# Patient Record
Sex: MALE | Race: WHITE | NOT HISPANIC OR LATINO | Employment: OTHER | URBAN - METROPOLITAN AREA
[De-identification: names, ages, dates, MRNs, and addresses within clinical notes are randomized per-mention and may not be internally consistent; named-entity substitution may affect disease eponyms.]

---

## 2017-10-07 ENCOUNTER — HOSPITAL ENCOUNTER (INPATIENT)
Facility: MEDICAL CENTER | Age: 72
LOS: 7 days | DRG: 281 | End: 2017-10-14
Attending: EMERGENCY MEDICINE | Admitting: HOSPITALIST
Payer: COMMERCIAL

## 2017-10-07 ENCOUNTER — RESOLUTE PROFESSIONAL BILLING HOSPITAL PROF FEE (OUTPATIENT)
Dept: HOSPITALIST | Facility: MEDICAL CENTER | Age: 72
End: 2017-10-07
Payer: COMMERCIAL

## 2017-10-07 DIAGNOSIS — I21.4 NSTEMI (NON-ST ELEVATED MYOCARDIAL INFARCTION) (HCC): ICD-10-CM

## 2017-10-07 LAB
ALBUMIN SERPL BCP-MCNC: 3.4 G/DL (ref 3.2–4.9)
ALBUMIN/GLOB SERPL: 1 G/DL
ALP SERPL-CCNC: 86 U/L (ref 30–99)
ALT SERPL-CCNC: 55 U/L (ref 2–50)
ANION GAP SERPL CALC-SCNC: 12 MMOL/L (ref 0–11.9)
APTT PPP: 39.9 SEC (ref 24.7–36)
AST SERPL-CCNC: 67 U/L (ref 12–45)
BASOPHILS # BLD AUTO: 0.8 % (ref 0–1.8)
BASOPHILS # BLD: 0.13 K/UL (ref 0–0.12)
BILIRUB SERPL-MCNC: 1.3 MG/DL (ref 0.1–1.5)
BNP SERPL-MCNC: 330 PG/ML (ref 0–100)
BUN SERPL-MCNC: 23 MG/DL (ref 8–22)
CALCIUM SERPL-MCNC: 8.8 MG/DL (ref 8.5–10.5)
CHLORIDE SERPL-SCNC: 105 MMOL/L (ref 96–112)
CO2 SERPL-SCNC: 16 MMOL/L (ref 20–33)
CREAT SERPL-MCNC: 1.5 MG/DL (ref 0.5–1.4)
EOSINOPHIL # BLD AUTO: 0.23 K/UL (ref 0–0.51)
EOSINOPHIL NFR BLD: 1.4 % (ref 0–6.9)
ERYTHROCYTE [DISTWIDTH] IN BLOOD BY AUTOMATED COUNT: 42.8 FL (ref 35.9–50)
GFR SERPL CREATININE-BSD FRML MDRD: 46 ML/MIN/1.73 M 2
GLOBULIN SER CALC-MCNC: 3.4 G/DL (ref 1.9–3.5)
GLUCOSE SERPL-MCNC: 154 MG/DL (ref 65–99)
HCT VFR BLD AUTO: 42 % (ref 42–52)
HGB BLD-MCNC: 14.6 G/DL (ref 14–18)
IMM GRANULOCYTES # BLD AUTO: 0.17 K/UL (ref 0–0.11)
IMM GRANULOCYTES NFR BLD AUTO: 1 % (ref 0–0.9)
INR PPP: 1.55 (ref 0.87–1.13)
LV EJECT FRACT  99904: 20
LV EJECT FRACT MOD 2C 99903: 26.72
LV EJECT FRACT MOD 4C 99902: 32.65
LV EJECT FRACT MOD BP 99901: 15
LYMPHOCYTES # BLD AUTO: 3.2 K/UL (ref 1–4.8)
LYMPHOCYTES NFR BLD: 19.6 % (ref 22–41)
MCH RBC QN AUTO: 31 PG (ref 27–33)
MCHC RBC AUTO-ENTMCNC: 34.8 G/DL (ref 33.7–35.3)
MCV RBC AUTO: 89.2 FL (ref 81.4–97.8)
MONOCYTES # BLD AUTO: 1.64 K/UL (ref 0–0.85)
MONOCYTES NFR BLD AUTO: 10 % (ref 0–13.4)
NEUTROPHILS # BLD AUTO: 10.98 K/UL (ref 1.82–7.42)
NEUTROPHILS NFR BLD: 67.2 % (ref 44–72)
NRBC # BLD AUTO: 0 K/UL
NRBC BLD AUTO-RTO: 0 /100 WBC
PLATELET # BLD AUTO: 169 K/UL (ref 164–446)
PMV BLD AUTO: 11.6 FL (ref 9–12.9)
POTASSIUM SERPL-SCNC: 3.9 MMOL/L (ref 3.6–5.5)
PROT SERPL-MCNC: 6.8 G/DL (ref 6–8.2)
PROTHROMBIN TIME: 19.1 SEC (ref 12–14.6)
RBC # BLD AUTO: 4.71 M/UL (ref 4.7–6.1)
SODIUM SERPL-SCNC: 133 MMOL/L (ref 135–145)
TROPONIN I SERPL-MCNC: 2.85 NG/ML (ref 0–0.04)
WBC # BLD AUTO: 16.4 K/UL (ref 4.8–10.8)

## 2017-10-07 PROCEDURE — 700111 HCHG RX REV CODE 636 W/ 250 OVERRIDE (IP)

## 2017-10-07 PROCEDURE — 93306 TTE W/DOPPLER COMPLETE: CPT

## 2017-10-07 PROCEDURE — 99223 1ST HOSP IP/OBS HIGH 75: CPT | Performed by: HOSPITALIST

## 2017-10-07 PROCEDURE — 83880 ASSAY OF NATRIURETIC PEPTIDE: CPT

## 2017-10-07 PROCEDURE — 84484 ASSAY OF TROPONIN QUANT: CPT

## 2017-10-07 PROCEDURE — 85610 PROTHROMBIN TIME: CPT

## 2017-10-07 PROCEDURE — 93306 TTE W/DOPPLER COMPLETE: CPT | Mod: 26 | Performed by: INTERNAL MEDICINE

## 2017-10-07 PROCEDURE — 85025 COMPLETE CBC W/AUTO DIFF WBC: CPT

## 2017-10-07 PROCEDURE — 99285 EMERGENCY DEPT VISIT HI MDM: CPT

## 2017-10-07 PROCEDURE — 85730 THROMBOPLASTIN TIME PARTIAL: CPT

## 2017-10-07 PROCEDURE — 770020 HCHG ROOM/CARE - TELE (206)

## 2017-10-07 PROCEDURE — 80053 COMPREHEN METABOLIC PANEL: CPT

## 2017-10-07 PROCEDURE — 36415 COLL VENOUS BLD VENIPUNCTURE: CPT

## 2017-10-07 PROCEDURE — 94760 N-INVAS EAR/PLS OXIMETRY 1: CPT

## 2017-10-07 PROCEDURE — 93005 ELECTROCARDIOGRAM TRACING: CPT | Performed by: EMERGENCY MEDICINE

## 2017-10-07 RX ORDER — SPIRONOLACTONE 25 MG/1
12.5 TABLET ORAL DAILY
COMMUNITY

## 2017-10-07 RX ORDER — METOPROLOL SUCCINATE 50 MG/1
100 TABLET, EXTENDED RELEASE ORAL DAILY
Status: DISCONTINUED | OUTPATIENT
Start: 2017-10-08 | End: 2017-10-13

## 2017-10-07 RX ORDER — LOSARTAN POTASSIUM 50 MG/1
50 TABLET ORAL DAILY
COMMUNITY

## 2017-10-07 RX ORDER — HEPARIN SODIUM 1000 [USP'U]/ML
3200 INJECTION, SOLUTION INTRAVENOUS; SUBCUTANEOUS PRN
Status: DISCONTINUED | OUTPATIENT
Start: 2017-10-07 | End: 2017-10-07

## 2017-10-07 RX ORDER — FENOFIBRATE 54 MG/1
54 TABLET ORAL DAILY
Status: ON HOLD | COMMUNITY
End: 2017-10-14

## 2017-10-07 RX ORDER — HEPARIN SODIUM 1000 [USP'U]/ML
6000 INJECTION, SOLUTION INTRAVENOUS; SUBCUTANEOUS ONCE
Status: DISCONTINUED | OUTPATIENT
Start: 2017-10-07 | End: 2017-10-07

## 2017-10-07 RX ORDER — RANITIDINE 150 MG/1
150 TABLET ORAL 2 TIMES DAILY
COMMUNITY

## 2017-10-07 RX ORDER — ONDANSETRON 2 MG/ML
4 INJECTION INTRAMUSCULAR; INTRAVENOUS EVERY 4 HOURS PRN
Status: DISCONTINUED | OUTPATIENT
Start: 2017-10-07 | End: 2017-10-08

## 2017-10-07 RX ORDER — GABAPENTIN 300 MG/1
600 CAPSULE ORAL 2 TIMES DAILY
COMMUNITY

## 2017-10-07 RX ORDER — ONDANSETRON 4 MG/1
4 TABLET, ORALLY DISINTEGRATING ORAL EVERY 4 HOURS PRN
Status: DISCONTINUED | OUTPATIENT
Start: 2017-10-07 | End: 2017-10-08

## 2017-10-07 RX ORDER — FUROSEMIDE 20 MG/1
20 TABLET ORAL DAILY
COMMUNITY

## 2017-10-07 RX ORDER — METOPROLOL SUCCINATE 100 MG/1
100 TABLET, EXTENDED RELEASE ORAL DAILY
Status: ON HOLD | COMMUNITY
End: 2017-10-14

## 2017-10-07 RX ORDER — ACETAMINOPHEN 325 MG/1
650 TABLET ORAL EVERY 6 HOURS PRN
Status: DISCONTINUED | OUTPATIENT
Start: 2017-10-07 | End: 2017-10-14 | Stop reason: HOSPADM

## 2017-10-07 RX ORDER — HEPARIN SODIUM 1000 [USP'U]/ML
3200 INJECTION, SOLUTION INTRAVENOUS; SUBCUTANEOUS PRN
Status: DISCONTINUED | OUTPATIENT
Start: 2017-10-07 | End: 2017-10-08

## 2017-10-07 RX ORDER — BISACODYL 10 MG
10 SUPPOSITORY, RECTAL RECTAL
Status: DISCONTINUED | OUTPATIENT
Start: 2017-10-07 | End: 2017-10-14 | Stop reason: HOSPADM

## 2017-10-07 RX ORDER — OXYCODONE HYDROCHLORIDE 5 MG/1
2.5 TABLET ORAL
Status: DISCONTINUED | OUTPATIENT
Start: 2017-10-07 | End: 2017-10-14 | Stop reason: HOSPADM

## 2017-10-07 RX ORDER — FUROSEMIDE 20 MG/1
20 TABLET ORAL DAILY
Status: DISCONTINUED | OUTPATIENT
Start: 2017-10-08 | End: 2017-10-14 | Stop reason: HOSPADM

## 2017-10-07 RX ORDER — FENOFIBRATE 54 MG/1
54 TABLET ORAL DAILY
Status: DISCONTINUED | OUTPATIENT
Start: 2017-10-08 | End: 2017-10-07

## 2017-10-07 RX ORDER — ISOSORBIDE MONONITRATE 60 MG/1
60 TABLET, EXTENDED RELEASE ORAL EVERY MORNING
COMMUNITY

## 2017-10-07 RX ORDER — SPIRONOLACTONE 25 MG/1
12.5 TABLET ORAL DAILY
Status: DISCONTINUED | OUTPATIENT
Start: 2017-10-08 | End: 2017-10-08

## 2017-10-07 RX ORDER — RANITIDINE 150 MG/1
150 TABLET ORAL 2 TIMES DAILY
Status: DISCONTINUED | OUTPATIENT
Start: 2017-10-07 | End: 2017-10-07

## 2017-10-07 RX ORDER — ISOSORBIDE MONONITRATE 30 MG/1
60 TABLET, EXTENDED RELEASE ORAL EVERY MORNING
Status: DISCONTINUED | OUTPATIENT
Start: 2017-10-08 | End: 2017-10-14 | Stop reason: HOSPADM

## 2017-10-07 RX ORDER — FENOFIBRATE 67 MG/1
67 CAPSULE ORAL DAILY
Status: DISCONTINUED | OUTPATIENT
Start: 2017-10-08 | End: 2017-10-08

## 2017-10-07 RX ORDER — FAMOTIDINE 20 MG/1
20 TABLET, FILM COATED ORAL 2 TIMES DAILY
Status: DISCONTINUED | OUTPATIENT
Start: 2017-10-08 | End: 2017-10-14 | Stop reason: HOSPADM

## 2017-10-07 RX ORDER — POLYETHYLENE GLYCOL 3350 17 G/17G
1 POWDER, FOR SOLUTION ORAL
Status: DISCONTINUED | OUTPATIENT
Start: 2017-10-07 | End: 2017-10-14 | Stop reason: HOSPADM

## 2017-10-07 RX ORDER — LOSARTAN POTASSIUM 50 MG/1
50 TABLET ORAL DAILY
Status: DISCONTINUED | OUTPATIENT
Start: 2017-10-08 | End: 2017-10-10

## 2017-10-07 RX ORDER — DEXTROSE MONOHYDRATE 25 G/50ML
25 INJECTION, SOLUTION INTRAVENOUS
Status: DISCONTINUED | OUTPATIENT
Start: 2017-10-07 | End: 2017-10-14 | Stop reason: HOSPADM

## 2017-10-07 RX ORDER — AMOXICILLIN 250 MG
2 CAPSULE ORAL 2 TIMES DAILY
Status: DISCONTINUED | OUTPATIENT
Start: 2017-10-08 | End: 2017-10-14 | Stop reason: HOSPADM

## 2017-10-07 RX ORDER — GABAPENTIN 300 MG/1
600 CAPSULE ORAL 2 TIMES DAILY
Status: DISCONTINUED | OUTPATIENT
Start: 2017-10-08 | End: 2017-10-14 | Stop reason: HOSPADM

## 2017-10-07 RX ORDER — OXYCODONE HYDROCHLORIDE 5 MG/1
5 TABLET ORAL
Status: DISCONTINUED | OUTPATIENT
Start: 2017-10-07 | End: 2017-10-14 | Stop reason: HOSPADM

## 2017-10-07 RX ADMIN — HEPARIN SODIUM 1200 UNITS/HR: 5000 INJECTION, SOLUTION INTRAVENOUS at 23:46

## 2017-10-07 ASSESSMENT — PATIENT HEALTH QUESTIONNAIRE - PHQ9
SUM OF ALL RESPONSES TO PHQ QUESTIONS 1-9: 0
SUM OF ALL RESPONSES TO PHQ9 QUESTIONS 1 AND 2: 0
1. LITTLE INTEREST OR PLEASURE IN DOING THINGS: NOT AT ALL
2. FEELING DOWN, DEPRESSED, IRRITABLE, OR HOPELESS: NOT AT ALL

## 2017-10-07 ASSESSMENT — COPD QUESTIONNAIRES
HAVE YOU SMOKED AT LEAST 100 CIGARETTES IN YOUR ENTIRE LIFE: YES
DURING THE PAST 4 WEEKS HOW MUCH DID YOU FEEL SHORT OF BREATH: NONE/LITTLE OF THE TIME
DO YOU EVER COUGH UP ANY MUCUS OR PHLEGM?: NO/ONLY WITH OCCASIONAL COLDS OR INFECTIONS
COPD SCREENING SCORE: 4

## 2017-10-07 ASSESSMENT — LIFESTYLE VARIABLES
EVER_SMOKED: YES
EVER_SMOKED: YES
ALCOHOL_USE: NO

## 2017-10-07 ASSESSMENT — PAIN SCALES - GENERAL: PAINLEVEL_OUTOF10: 0

## 2017-10-08 ENCOUNTER — APPOINTMENT (OUTPATIENT)
Dept: RADIOLOGY | Facility: MEDICAL CENTER | Age: 72
DRG: 281 | End: 2017-10-08
Attending: INTERNAL MEDICINE
Payer: COMMERCIAL

## 2017-10-08 PROBLEM — E11.9 DIABETES MELLITUS (HCC): Status: ACTIVE | Noted: 2017-10-08

## 2017-10-08 PROBLEM — I50.22 CHRONIC SYSTOLIC CHF (CONGESTIVE HEART FAILURE) (HCC): Status: ACTIVE | Noted: 2017-10-08

## 2017-10-08 PROBLEM — I42.9 CARDIOMYOPATHY (HCC): Status: ACTIVE | Noted: 2017-10-08

## 2017-10-08 PROBLEM — E78.5 DYSLIPIDEMIA: Status: ACTIVE | Noted: 2017-10-08

## 2017-10-08 PROBLEM — Z95.810 AICD (AUTOMATIC CARDIOVERTER/DEFIBRILLATOR) PRESENT: Status: ACTIVE | Noted: 2017-10-08

## 2017-10-08 PROBLEM — I10 HTN (HYPERTENSION): Status: ACTIVE | Noted: 2017-10-08

## 2017-10-08 PROBLEM — I48.0 PAROXYSMAL ATRIAL FIBRILLATION (HCC): Status: ACTIVE | Noted: 2017-10-08

## 2017-10-08 PROBLEM — D68.9 COAGULOPATHY (HCC): Status: ACTIVE | Noted: 2017-10-08

## 2017-10-08 PROBLEM — N19 RENAL FAILURE: Status: ACTIVE | Noted: 2017-10-08

## 2017-10-08 LAB
ACT BLD: 158 SEC (ref 74–137)
ANION GAP SERPL CALC-SCNC: 9 MMOL/L (ref 0–11.9)
APPEARANCE UR: CLEAR
APTT PPP: 82.7 SEC (ref 24.7–36)
APTT PPP: 83.4 SEC (ref 24.7–36)
BACTERIA #/AREA URNS HPF: NEGATIVE /HPF
BILIRUB UR QL STRIP.AUTO: NEGATIVE
BUN SERPL-MCNC: 23 MG/DL (ref 8–22)
CALCIUM SERPL-MCNC: 8.3 MG/DL (ref 8.5–10.5)
CHLORIDE SERPL-SCNC: 105 MMOL/L (ref 96–112)
CO2 SERPL-SCNC: 21 MMOL/L (ref 20–33)
COLOR UR: YELLOW
CREAT SERPL-MCNC: 1.35 MG/DL (ref 0.5–1.4)
EKG IMPRESSION: NORMAL
EPI CELLS #/AREA URNS HPF: NEGATIVE /HPF
ERYTHROCYTE [DISTWIDTH] IN BLOOD BY AUTOMATED COUNT: 43.6 FL (ref 35.9–50)
GFR SERPL CREATININE-BSD FRML MDRD: 52 ML/MIN/1.73 M 2
GLUCOSE BLD-MCNC: 186 MG/DL (ref 65–99)
GLUCOSE BLD-MCNC: 211 MG/DL (ref 65–99)
GLUCOSE BLD-MCNC: 229 MG/DL (ref 65–99)
GLUCOSE BLD-MCNC: 256 MG/DL (ref 65–99)
GLUCOSE SERPL-MCNC: 194 MG/DL (ref 65–99)
GLUCOSE UR STRIP.AUTO-MCNC: 100 MG/DL
HCT VFR BLD AUTO: 41.6 % (ref 42–52)
HGB BLD-MCNC: 14.3 G/DL (ref 14–18)
HYALINE CASTS #/AREA URNS LPF: ABNORMAL /LPF
INR PPP: 1.51 (ref 0.87–1.13)
KETONES UR STRIP.AUTO-MCNC: NEGATIVE MG/DL
LEUKOCYTE ESTERASE UR QL STRIP.AUTO: NEGATIVE
MCH RBC QN AUTO: 31 PG (ref 27–33)
MCHC RBC AUTO-ENTMCNC: 34.4 G/DL (ref 33.7–35.3)
MCV RBC AUTO: 90.2 FL (ref 81.4–97.8)
MICRO URNS: ABNORMAL
NITRITE UR QL STRIP.AUTO: NEGATIVE
PH UR STRIP.AUTO: 5 [PH]
PLATELET # BLD AUTO: 164 K/UL (ref 164–446)
PMV BLD AUTO: 11.3 FL (ref 9–12.9)
POTASSIUM SERPL-SCNC: 3.9 MMOL/L (ref 3.6–5.5)
PROT UR QL STRIP: 100 MG/DL
PROTHROMBIN TIME: 18.7 SEC (ref 12–14.6)
PTH-INTACT SERPL-MCNC: 153.9 PG/ML (ref 14–72)
RBC # BLD AUTO: 4.61 M/UL (ref 4.7–6.1)
RBC # URNS HPF: ABNORMAL /HPF
RBC UR QL AUTO: NEGATIVE
SODIUM SERPL-SCNC: 135 MMOL/L (ref 135–145)
SP GR UR STRIP.AUTO: 1.01
TROPONIN I SERPL-MCNC: 2.65 NG/ML (ref 0–0.04)
TROPONIN I SERPL-MCNC: 3.16 NG/ML (ref 0–0.04)
UROBILINOGEN UR STRIP.AUTO-MCNC: 0.2 MG/DL
WBC # BLD AUTO: 13.3 K/UL (ref 4.8–10.8)
WBC #/AREA URNS HPF: ABNORMAL /HPF

## 2017-10-08 PROCEDURE — 700102 HCHG RX REV CODE 250 W/ 637 OVERRIDE(OP)

## 2017-10-08 PROCEDURE — A9270 NON-COVERED ITEM OR SERVICE: HCPCS

## 2017-10-08 PROCEDURE — 93458 L HRT ARTERY/VENTRICLE ANGIO: CPT

## 2017-10-08 PROCEDURE — C1769 GUIDE WIRE: HCPCS

## 2017-10-08 PROCEDURE — 71020 DX-CHEST-2 VIEWS: CPT

## 2017-10-08 PROCEDURE — 700111 HCHG RX REV CODE 636 W/ 250 OVERRIDE (IP): Performed by: FAMILY MEDICINE

## 2017-10-08 PROCEDURE — 90662 IIV NO PRSV INCREASED AG IM: CPT | Performed by: FAMILY MEDICINE

## 2017-10-08 PROCEDURE — 3E0234Z INTRODUCTION OF SERUM, TOXOID AND VACCINE INTO MUSCLE, PERCUTANEOUS APPROACH: ICD-10-PCS | Performed by: HOSPITALIST

## 2017-10-08 PROCEDURE — 83970 ASSAY OF PARATHORMONE: CPT

## 2017-10-08 PROCEDURE — C1887 CATHETER, GUIDING: HCPCS

## 2017-10-08 PROCEDURE — 85347 COAGULATION TIME ACTIVATED: CPT

## 2017-10-08 PROCEDURE — B2151ZZ FLUOROSCOPY OF LEFT HEART USING LOW OSMOLAR CONTRAST: ICD-10-PCS | Performed by: INTERNAL MEDICINE

## 2017-10-08 PROCEDURE — 4A023N7 MEASUREMENT OF CARDIAC SAMPLING AND PRESSURE, LEFT HEART, PERCUTANEOUS APPROACH: ICD-10-PCS | Performed by: INTERNAL MEDICINE

## 2017-10-08 PROCEDURE — 84484 ASSAY OF TROPONIN QUANT: CPT | Mod: 91

## 2017-10-08 PROCEDURE — A9270 NON-COVERED ITEM OR SERVICE: HCPCS | Performed by: HOSPITALIST

## 2017-10-08 PROCEDURE — 307093 HCHG TR BAND RADIAL

## 2017-10-08 PROCEDURE — 36415 COLL VENOUS BLD VENIPUNCTURE: CPT

## 2017-10-08 PROCEDURE — 700105 HCHG RX REV CODE 258: Performed by: FAMILY MEDICINE

## 2017-10-08 PROCEDURE — B2111ZZ FLUOROSCOPY OF MULTIPLE CORONARY ARTERIES USING LOW OSMOLAR CONTRAST: ICD-10-PCS | Performed by: INTERNAL MEDICINE

## 2017-10-08 PROCEDURE — A9270 NON-COVERED ITEM OR SERVICE: HCPCS | Performed by: INTERNAL MEDICINE

## 2017-10-08 PROCEDURE — 82962 GLUCOSE BLOOD TEST: CPT | Mod: 91

## 2017-10-08 PROCEDURE — 85610 PROTHROMBIN TIME: CPT

## 2017-10-08 PROCEDURE — 700101 HCHG RX REV CODE 250

## 2017-10-08 PROCEDURE — 99233 SBSQ HOSP IP/OBS HIGH 50: CPT | Performed by: FAMILY MEDICINE

## 2017-10-08 PROCEDURE — 700111 HCHG RX REV CODE 636 W/ 250 OVERRIDE (IP)

## 2017-10-08 PROCEDURE — 700105 HCHG RX REV CODE 258: Performed by: INTERNAL MEDICINE

## 2017-10-08 PROCEDURE — 360979 HCHG DIAGNOSTIC CATH

## 2017-10-08 PROCEDURE — C1894 INTRO/SHEATH, NON-LASER: HCPCS

## 2017-10-08 PROCEDURE — 85730 THROMBOPLASTIN TIME PARTIAL: CPT

## 2017-10-08 PROCEDURE — 99152 MOD SED SAME PHYS/QHP 5/>YRS: CPT

## 2017-10-08 PROCEDURE — 700102 HCHG RX REV CODE 250 W/ 637 OVERRIDE(OP): Performed by: INTERNAL MEDICINE

## 2017-10-08 PROCEDURE — 80048 BASIC METABOLIC PNL TOTAL CA: CPT

## 2017-10-08 PROCEDURE — 85027 COMPLETE CBC AUTOMATED: CPT

## 2017-10-08 PROCEDURE — 93010 ELECTROCARDIOGRAM REPORT: CPT | Performed by: INTERNAL MEDICINE

## 2017-10-08 PROCEDURE — 81001 URINALYSIS AUTO W/SCOPE: CPT

## 2017-10-08 PROCEDURE — 93005 ELECTROCARDIOGRAM TRACING: CPT | Performed by: FAMILY MEDICINE

## 2017-10-08 PROCEDURE — 304952 HCHG R 2 PADS

## 2017-10-08 PROCEDURE — 700102 HCHG RX REV CODE 250 W/ 637 OVERRIDE(OP): Performed by: HOSPITALIST

## 2017-10-08 PROCEDURE — 770020 HCHG ROOM/CARE - TELE (206)

## 2017-10-08 PROCEDURE — 90471 IMMUNIZATION ADMIN: CPT

## 2017-10-08 RX ORDER — CLOPIDOGREL BISULFATE 75 MG/1
75 TABLET ORAL DAILY
Status: DISCONTINUED | OUTPATIENT
Start: 2017-10-08 | End: 2017-10-08

## 2017-10-08 RX ORDER — ASPIRIN 325 MG
325 TABLET ORAL DAILY
Status: DISCONTINUED | OUTPATIENT
Start: 2017-10-08 | End: 2017-10-08

## 2017-10-08 RX ORDER — MIDAZOLAM HYDROCHLORIDE 1 MG/ML
INJECTION INTRAMUSCULAR; INTRAVENOUS
Status: COMPLETED
Start: 2017-10-08 | End: 2017-10-08

## 2017-10-08 RX ORDER — SODIUM CHLORIDE 9 MG/ML
INJECTION, SOLUTION INTRAVENOUS CONTINUOUS
Status: DISCONTINUED | OUTPATIENT
Start: 2017-10-08 | End: 2017-10-08

## 2017-10-08 RX ORDER — DEXAMETHASONE SODIUM PHOSPHATE 4 MG/ML
4 INJECTION, SOLUTION INTRA-ARTICULAR; INTRALESIONAL; INTRAMUSCULAR; INTRAVENOUS; SOFT TISSUE
Status: DISCONTINUED | OUTPATIENT
Start: 2017-10-08 | End: 2017-10-14 | Stop reason: HOSPADM

## 2017-10-08 RX ORDER — SCOLOPAMINE TRANSDERMAL SYSTEM 1 MG/1
1 PATCH, EXTENDED RELEASE TRANSDERMAL
Status: DISCONTINUED | OUTPATIENT
Start: 2017-10-08 | End: 2017-10-14 | Stop reason: HOSPADM

## 2017-10-08 RX ORDER — SODIUM CHLORIDE 9 MG/ML
INJECTION, SOLUTION INTRAVENOUS CONTINUOUS
Status: ACTIVE | OUTPATIENT
Start: 2017-10-08 | End: 2017-10-08

## 2017-10-08 RX ORDER — VERAPAMIL HYDROCHLORIDE 2.5 MG/ML
INJECTION, SOLUTION INTRAVENOUS
Status: COMPLETED
Start: 2017-10-08 | End: 2017-10-08

## 2017-10-08 RX ORDER — CLOPIDOGREL BISULFATE 75 MG/1
300 TABLET ORAL ONCE
Status: COMPLETED | OUTPATIENT
Start: 2017-10-08 | End: 2017-10-08

## 2017-10-08 RX ORDER — CLOPIDOGREL 300 MG/1
TABLET, FILM COATED ORAL
Status: COMPLETED
Start: 2017-10-08 | End: 2017-10-08

## 2017-10-08 RX ORDER — CLOPIDOGREL BISULFATE 75 MG/1
75 TABLET ORAL DAILY
Status: DISCONTINUED | OUTPATIENT
Start: 2017-10-09 | End: 2017-10-14 | Stop reason: HOSPADM

## 2017-10-08 RX ORDER — ROSUVASTATIN CALCIUM 20 MG/1
40 TABLET, COATED ORAL EVERY EVENING
Status: DISCONTINUED | OUTPATIENT
Start: 2017-10-08 | End: 2017-10-14 | Stop reason: HOSPADM

## 2017-10-08 RX ORDER — HEPARIN SODIUM,PORCINE 1000/ML
VIAL (ML) INJECTION
Status: COMPLETED
Start: 2017-10-08 | End: 2017-10-08

## 2017-10-08 RX ORDER — SODIUM CHLORIDE 9 MG/ML
INJECTION, SOLUTION INTRAVENOUS
Status: ACTIVE
Start: 2017-10-08 | End: 2017-10-08

## 2017-10-08 RX ORDER — ROSUVASTATIN CALCIUM 20 MG/1
20 TABLET, COATED ORAL EVERY EVENING
Status: DISCONTINUED | OUTPATIENT
Start: 2017-10-08 | End: 2017-10-08

## 2017-10-08 RX ORDER — HALOPERIDOL 5 MG/ML
1 INJECTION INTRAMUSCULAR EVERY 6 HOURS PRN
Status: DISCONTINUED | OUTPATIENT
Start: 2017-10-08 | End: 2017-10-14 | Stop reason: HOSPADM

## 2017-10-08 RX ORDER — SPIRONOLACTONE 25 MG/1
25 TABLET ORAL DAILY
Status: DISCONTINUED | OUTPATIENT
Start: 2017-10-09 | End: 2017-10-08

## 2017-10-08 RX ORDER — ONDANSETRON 2 MG/ML
4 INJECTION INTRAMUSCULAR; INTRAVENOUS EVERY 4 HOURS PRN
Status: DISCONTINUED | OUTPATIENT
Start: 2017-10-08 | End: 2017-10-14 | Stop reason: HOSPADM

## 2017-10-08 RX ORDER — LIDOCAINE HYDROCHLORIDE 20 MG/ML
INJECTION, SOLUTION INFILTRATION; PERINEURAL
Status: COMPLETED
Start: 2017-10-08 | End: 2017-10-08

## 2017-10-08 RX ORDER — ASPIRIN 325 MG
81 TABLET ORAL DAILY
Status: DISCONTINUED | OUTPATIENT
Start: 2017-10-09 | End: 2017-10-08

## 2017-10-08 RX ORDER — DIPHENHYDRAMINE HYDROCHLORIDE 50 MG/ML
25 INJECTION INTRAMUSCULAR; INTRAVENOUS EVERY 6 HOURS PRN
Status: DISCONTINUED | OUTPATIENT
Start: 2017-10-08 | End: 2017-10-14 | Stop reason: HOSPADM

## 2017-10-08 RX ADMIN — INSULIN LISPRO 5 UNITS: 100 INJECTION, SOLUTION INTRAVENOUS; SUBCUTANEOUS at 21:18

## 2017-10-08 RX ADMIN — LIDOCAINE HYDROCHLORIDE: 20 INJECTION, SOLUTION INFILTRATION; PERINEURAL at 14:05

## 2017-10-08 RX ADMIN — CLOPIDOGREL BISULFATE 300 MG: 300 TABLET, FILM COATED ORAL at 14:28

## 2017-10-08 RX ADMIN — METOPROLOL SUCCINATE 100 MG: 50 TABLET, EXTENDED RELEASE ORAL at 08:22

## 2017-10-08 RX ADMIN — MIDAZOLAM 1 MG: 1 INJECTION INTRAMUSCULAR; INTRAVENOUS at 14:14

## 2017-10-08 RX ADMIN — SODIUM CHLORIDE: 9 INJECTION, SOLUTION INTRAVENOUS at 15:37

## 2017-10-08 RX ADMIN — VERAPAMIL HYDROCHLORIDE 2.5 MG: 2.5 INJECTION, SOLUTION INTRAVENOUS at 14:05

## 2017-10-08 RX ADMIN — HEPARIN SODIUM 2000 UNITS: 200 INJECTION, SOLUTION INTRAVENOUS at 14:05

## 2017-10-08 RX ADMIN — GABAPENTIN 600 MG: 300 CAPSULE ORAL at 21:08

## 2017-10-08 RX ADMIN — GABAPENTIN 600 MG: 300 CAPSULE ORAL at 08:22

## 2017-10-08 RX ADMIN — FAMOTIDINE 20 MG: 20 TABLET, FILM COATED ORAL at 21:09

## 2017-10-08 RX ADMIN — INFLUENZA A VIRUSA/MICHIGAN/45/2015 X-275 (H1N1) ANTIGEN (FORMALDEHYDE INACTIVATED), INFLUENZA A VIRUS A/HONG KONG/4801/2014 X-263B (H3N2) ANTIGEN (FORMALDEHYDE INACTIVATED), AND INFLUENZA B VIRUS B/BRISBANE/60/2008 ANTIGEN (FORMALDEHYDE INACTIVATED) 0.5 ML: 60; 60; 60 INJECTION, SUSPENSION INTRAMUSCULAR at 21:10

## 2017-10-08 RX ADMIN — ISOSORBIDE MONONITRATE 60 MG: 30 TABLET, EXTENDED RELEASE ORAL at 08:22

## 2017-10-08 RX ADMIN — ASPIRIN 325 MG: 325 TABLET, COATED ORAL at 08:23

## 2017-10-08 RX ADMIN — ROSUVASTATIN CALCIUM 40 MG: 20 TABLET, FILM COATED ORAL at 21:08

## 2017-10-08 RX ADMIN — STANDARDIZED SENNA CONCENTRATE AND DOCUSATE SODIUM 2 TABLET: 8.6; 5 TABLET, FILM COATED ORAL at 08:22

## 2017-10-08 RX ADMIN — INSULIN LISPRO 3 UNITS: 100 INJECTION, SOLUTION INTRAVENOUS; SUBCUTANEOUS at 18:06

## 2017-10-08 RX ADMIN — CLOPIDOGREL 300 MG: 75 TABLET, FILM COATED ORAL at 16:51

## 2017-10-08 RX ADMIN — NITROGLYCERIN 10 ML: 20 INJECTION INTRAVENOUS at 14:05

## 2017-10-08 RX ADMIN — HEPARIN SODIUM: 1000 INJECTION, SOLUTION INTRAVENOUS; SUBCUTANEOUS at 14:05

## 2017-10-08 RX ADMIN — INSULIN LISPRO 2 UNITS: 100 INJECTION, SOLUTION INTRAVENOUS; SUBCUTANEOUS at 12:39

## 2017-10-08 RX ADMIN — STANDARDIZED SENNA CONCENTRATE AND DOCUSATE SODIUM 2 TABLET: 8.6; 5 TABLET, FILM COATED ORAL at 21:09

## 2017-10-08 RX ADMIN — INSULIN LISPRO 3 UNITS: 100 INJECTION, SOLUTION INTRAVENOUS; SUBCUTANEOUS at 06:24

## 2017-10-08 RX ADMIN — SPIRONOLACTONE 12.5 MG: 25 TABLET, FILM COATED ORAL at 08:22

## 2017-10-08 RX ADMIN — LOSARTAN POTASSIUM 50 MG: 50 TABLET, FILM COATED ORAL at 08:22

## 2017-10-08 RX ADMIN — FAMOTIDINE 20 MG: 20 TABLET, FILM COATED ORAL at 08:23

## 2017-10-08 RX ADMIN — SODIUM CHLORIDE: 9 INJECTION, SOLUTION INTRAVENOUS at 08:31

## 2017-10-08 RX ADMIN — FENTANYL CITRATE 50 MCG: 50 INJECTION, SOLUTION INTRAMUSCULAR; INTRAVENOUS at 14:14

## 2017-10-08 RX ADMIN — FUROSEMIDE 20 MG: 20 TABLET ORAL at 08:23

## 2017-10-08 ASSESSMENT — PATIENT HEALTH QUESTIONNAIRE - PHQ9
2. FEELING DOWN, DEPRESSED, IRRITABLE, OR HOPELESS: NOT AT ALL
SUM OF ALL RESPONSES TO PHQ9 QUESTIONS 1 AND 2: 0
1. LITTLE INTEREST OR PLEASURE IN DOING THINGS: NOT AT ALL
SUM OF ALL RESPONSES TO PHQ QUESTIONS 1-9: 0

## 2017-10-08 ASSESSMENT — ENCOUNTER SYMPTOMS
HEADACHES: 0
FEVER: 1
SORE THROAT: 0
BLURRED VISION: 0
ABDOMINAL PAIN: 0
CHILLS: 0
DIARRHEA: 0
BRUISES/BLEEDS EASILY: 0
WHEEZING: 0
DIZZINESS: 0
NAUSEA: 0
SHORTNESS OF BREATH: 0
COUGH: 0
FEVER: 0
VOMITING: 0
HEARTBURN: 0

## 2017-10-08 ASSESSMENT — PAIN SCALES - GENERAL
PAINLEVEL_OUTOF10: 0

## 2017-10-08 NOTE — ASSESSMENT & PLAN NOTE
Severe multi vessel  CAD. Previously not surgical candidate for bypass- no new chest pain/ dyspnea.   Fu Thallium study results-- await cards input.   Optimize medications w ASA, Crestor, metoprolol.   Long acting nitrate.

## 2017-10-08 NOTE — PROCEDURES
DATE OF SERVICE:  10/08/2017    PROCEDURES:  1.  Selective coronary angiography.  2.  Left heart catheterization.  3.  Left ventriculography.    INDICATIONS:  A 72-year-old gentleman with known ischemic cardiomyopathy,   [facility].  He had no chest pain, but troponin   positive for non-STEMI myocardial infarction.  He has been told in the distant   past on remote angiogram that he was a nonoperable candidate.  The patient is   undergoing angiography at this time to determine the extent of his coronary   artery disease.    DESCRIPTION OF PROCEDURE:  The right wrist was sterilely prepped and draped in   the usual fashion and the area of the right radial artery anesthetized with   2% lidocaine.  Conscious sedation was obtained using Versed 1 mg and fentanyl   50 mcg.    Right radial artery was easily entered and a 6-[place] sheath was placed.  An   ACT was drawn as he was on heparin infusion and the patient was given   Intra-arterial verapamil and nitroglycerin.  A 5-[place] catheter was   placed and advanced into the ostium of the left main coronary artery where   selective angiograms were performed.  This catheter was then manipulated into   the ostium of the right coronary artery where selective angiograms were again   performed.    Next, a pigtail catheter was exchanged and a left heart catheterization was   performed.  This was followed by left ventriculogram using 24 mL of contrast.    A left heart pullback was performed.  The catheter was removed and additional   nitroglycerin was given through the sheath.  The sheath was then removed and   hemostasis was obtained by direct compression of the artery with the Hemoband.    There were no complications.    ESTIMATED BLOOD LOSS:  5 mL    FLUOROSCOPY TIME:  2.2 minutes.    X-RAY DOSE-AREA PRODUCT:  10,727.    TOTAL CONTRAST MEDIA:  100 mL of Omnipaque 350.  There were no complications.    HEMODYNAMICS:  Fluoroscopy of heart demonstrates  evidence of an AICD and   severe coronary artery calcification.    The left ventriculogram demonstrates left ventricular enlargement and severe   global hypokinesis.  The calculated ejection fraction is 23%.    The right coronary artery is a nondominant vessel and has elongated 50%   stenosis in its proximal segment.  There are no significant collaterals from   the right coronary artery to the left.    The left main is calcified and has less than 20% stenosis.  This bifurcates   into the LAD and circumflex.  The circumflex is a dominant vessel.  In the   proximal segment arises a small marginal artery.  At the level of the small   marginal artery, there is a high grade circumflex lesion that appears to be   90% in severity in some views.  The mid circumflex has mild plaquing and then   more distally arises are large marginal artery that has an elongated 60-70%   stenosis at its origin.  Beyond this point, the dominant circumflex is   occluded and the distal circumflex including the PDA and a distal marginal   artery fill via left-sided collaterals.    The LAD gives rise to first diagonal artery and then is completely occluded.    This large diagonal artery has a high-grade stenosis in its proximal segment   of 75%.  The mid to distal LAD is filled via left-sided collaterals.  The LAD   appears to be diffusely stenotic _____ the distal portion.    Hemodynamics reveal sinus rhythm throughout the procedure.  Aortic pressure   119/69 mmHg.  LV pressure 141/35 mmHg.    IMPRESSION:  1.  Severe left ventricular systolic dysfunction with ejection fraction of 23%   and elevated left ventricular end-diastolic pressure of 35.  2.  Coronary artery disease with complete occlusion of the mid left anterior   descending and distal dominant circumflex.  The mid to distal left anterior   descending filled via collaterals as does the posterior descending artery and   distal marginal artery of the circumflex.  3.  High grade stenosis  in the proximal circumflex, 80-90% in severity.  4.  Elongated 60-70% stenosis at the origin of the circumflex marginal artery   arising proximal to the occlusion of the circumflex.  5.  Large left anterior descending diagonal artery with at least 80% stenosis   in its proximal segment.  6.  Collateral filling of the mid to distal left anterior descending and   posterior descending artery of the circumflex via left-sided collaterals.       ____________________________________     MD YAJAIRA VALENCIA / NTS    DD:  10/08/2017 14:43:56  DT:  10/08/2017 15:24:16    D#:  [id number]  Job#:  [id number]

## 2017-10-08 NOTE — PROGRESS NOTES
Report received from ER nurse Muna. Pt transported up to tele 7 via gurney. Ambulated to bed x 1 assist with shuffle gait. Pt verbalized dizziness with ambulation. A&O x 4. Declines chest pain or any pain at this time. Assessment complete. Vitals stable. No other concerns, complains or distress. Tele box on. Chart reviewed. Bed in lowest position, treaded slipper sock on, and call light within reach.

## 2017-10-08 NOTE — ED NOTES
Wendy from Lab called with critical result of Trop 2.85 at 2052. Critical lab result read back to Wendy .   Dr. Hyde notified of critical lab result at 2054.  Critical lab result read back by Dr. Hyde.

## 2017-10-08 NOTE — ED NOTES
[facility] Pt c/o weakness, lethargy, and confusion. [facility] to [facility] pt had troponin of 2.7. Pt denies chest pain, but c/o SOB. HX 2 MI's, CHF, and AICD.     Pt was given 324 ASA and 200cc fluid prior to arrival.   Pt axo x4.

## 2017-10-08 NOTE — PROGRESS NOTES
Cardiology Progress Note               Author: Henrry Anglin Date & Time created: 10/8/2017  8:01 AM     Interval History:  [facility]opnea and dyspnea, but no chest pain  No angina, but troponin elevated c/w non STEMI.  Angiogram about 20 years ago and he was told he was non-operative.  EF ,30% with [facility]ICD [facility]    Review of Systems   Constitutional: Positive for fever.   Respiratory: Negative for shortness of breath.    Cardiovascular: Negative for chest pain.   Endo/Heme/Allergies: Does not bruise/bleed easily.       Physical Exam   Constitutional: He is oriented to person, place, and time. No distress.   HENT:   Head: Normocephalic and atraumatic.   Eyes: No scleral icterus.   Cardiovascular: Normal rate and regular rhythm.    No murmur heard.  Pulmonary/Chest: Breath sounds normal. No respiratory distress.   Abdominal: Soft. He exhibits no distension. There is no tenderness.   Neurological: He is alert and oriented to person, place, and time. No cranial nerve deficit.   Skin: Skin is warm and dry.       Hemodynamics:  Temp (24hrs), Av.4 °C (97.5 °F), Min:36.3 °C (97.4 °F), Max:36.4 °C (97.6 °F)  Temperature: 36.3 °C (97.4 °F)  Pulse  Av.4  Min: 99  Max: 103   Blood Pressure : 155/99, NIBP: 152/75     Respiratory:    Respiration: 18, Pulse Oximetry: 99 %, O2 Daily Delivery Respiratory : Room Air with O2 Available        RUL Breath Sounds: Clear, RML Breath Sounds: Clear, RLL Breath Sounds: Fine Crackles, CORBY Breath Sounds: Clear, LLL Breath Sounds: Fine Crackles  Fluids:     Weight: 104.9 kg (231 lb 4.2 oz)  GI/Nutrition:  Orders Placed This Encounter   Procedures   • Diet Order     Standing Status:   Standing     Number of Occurrences:   1     Order Specific Question:   Diet:     Answer:   Cardiac [6]     Order Specific Question:   Diet:     Answer:   Diabetic [3]   • Diet NPO after Midnight     Standing Status:   Standing     Number of Occurrences:   8      Order Specific Question:   Restrict to:     Answer:   Sips with Medications [3]     Lab Results:  Recent Labs      10/07/17   1945   WBC  16.4*   RBC  4.71   HEMOGLOBIN  14.6   HEMATOCRIT  42.0   MCV  89.2   MCH  31.0   MCHC  34.8   RDW  42.8   PLATELETCT  169   MPV  11.6     Recent Labs      10/07/17   1945   SODIUM  133*   POTASSIUM  3.9   CHLORIDE  105   CO2  16*   GLUCOSE  154*   BUN  23*   CREATININE  1.50*   CALCIUM  8.8     Recent Labs      10/07/17   1945  10/08/17   0528   APTT  39.9*  82.7*   INR  1.55*   --      Recent Labs      10/07/17   1945   BNPBTYPENAT  330*     Recent Labs      10/07/17   1945  10/08/17   0205  10/08/17   0528   TROPONINI  2.85*  2.65*  3.16*   BNPBTYPENAT  330*   --    --              Medical Decision Making, by Problem:  Active Hospital Problems    Diagnosis   • NSTEMI (non-ST elevated myocardial infarction) (CMS-[facility]) [I21.4]       Plan:  Non STEMI- troponins are rising.He was told many years ago that he was not an operative candidate. Plan angio later this morning.  S/P AICD-  Ischemic cardiomyopathy. BNP only 330 on admit  Diabetes mellitus type two.    Quality-Core Measures

## 2017-10-08 NOTE — H&P
DATE OF SERVICE:  10/07/2017    [facility]  [facility]    CHIEF COMPLAINT:  Dyspnea and lightheadedness.    HISTORY OF PRESENT ILLNESS:  Patient is a 72-year-old male with known history   of ischemic cardiomyopathy, ejection fraction of 20-25% and chronic atrial   fibrillation, currently receives his care at the [facility].  He was transferred   to our facility after he presented there complaining of lightheadedness,   dizziness and shortness of breath.  He denies any chest pain.  He has some   mild orthopnea.  He had 1 episode of diarrhea, but no melena or rectal   bleeding.  He has known history of ischemic cardiomyopathy, although he   reports that he has never had any cardiac stents placed.  He is on chronic   anticoagulation for his atrial fibrillation.  He reports that he has been   compliant with his medications.  He reports that his blood sugars have been   controlled.  He denies any loss of consciousness.  He denies any focal   weakness or numbness.  He denies any changes in his vision.  He denies any   palpitations.    REVIEW OF SYSTEMS:  As above, otherwise reviewed and negative.    PAST MEDICAL HISTORY:  Significant for ischemic cardiomyopathy, coronary   artery disease, type 2 diabetes, hypertension, atrial fibrillation and history   of stroke.    PAST SURGICAL HISTORY:  AICD implantation.    SOCIAL HISTORY:  He does not smoke.  No alcohol or illicit drug use.    FAMILY HISTORY:  Positive for diabetes, cardiovascular disease.    HOME MEDICATIONS:  Eliquis 5 mg b.i.d., vitamin D 5000 units once a week,   TriCor 54 mg daily, Lasix 20 mg daily, gabapentin 600 mg b.i.d., Imdur 60 mg   daily, losartan 50 mg daily, metformin 1000 mg b.i.d., Toprol- mg daily,   Zantac 150 mg daily and Aldactone 12.5 mg daily.    PHYSICAL EXAMINATION:  GENERAL:  He is alert, oriented x3.  VITAL SIGNS:  Temperature is 36.4, pulse is 99, respiratory rate is 18, blood   pressure 162/80 and pulse oximetry is  93%.  HEAD AND NECK:  Pupils equal.  Supple neck.  No jugular venous distention.    Oropharynx is clear.  No cervical lymphadenopathy.  HEART:  Regular rate and rhythm, normal S1, S2.  No murmurs, rubs or gallops.  LUNGS:  Clear with symmetric air entry bilaterally.  No chest wall tenderness.  ABDOMEN:  Soft, nontender.  Bowel sounds are positive.  No hepatosplenomegaly.  EXTREMITIES:  No edema, no clubbing, no cyanosis.  NEUROLOGIC:  No focal deficits.  SKIN:  He has multiple excoriations.    DIAGNOSTICS:  White blood cell count is 16.4, hemoglobin is 14.6, hematocrit   42 and platelet count 169.  Sodium 133, potassium 3.9, chloride 105,   bicarbonate is 16, glucose 154, BUN 23 and creatinine 1.5.  AST 67, ALT 55,   alkaline phosphatase is 86, total bilirubin is 1.3.  INR is 1.55.  Troponin I   is 2.85.  Echocardiogram reveals no prior studies available for comparison,   normal left ventricular chamber size, left ventricular ejection fraction   visually estimated to be 20%, severely reduced left ventricular systolic   function, global hypokinesis, pacer ICD wire seen in the right ventricle,   mildly dilated left atrium, aortic sclerosis without stenosis, trace aortic   insufficiency, mitral annular calcification, mild mitral regurgitation,   moderate tricuspid regurgitation, estimated right ventricular systolic   pressure 65 mmHg, normal pericardium without effusion.  EKG reveals sinus   rhythm with ST depressions in the lateral leads.  [facility] revealed no evidence of acute intracranial events incidental on acute   findings.  Chest x-ray revealed no acute cardiopulmonary disease.    ASSESSMENT:  1.  Non-ST elevation myocardial infarction.  2.  Chronic systolic congestive heart failure.  3.  Paroxysmal atrial fibrillation.  4.  Type 2 diabetes.  5.  Hypertension.  6.  Dyslipidemia.  7.  Acute renal insufficiency, unknown baseline serum creatinine.  8.  Elevated LFTs.  9.  Leukocytosis with no  clear signs of infection.    PLAN:  The patient will be admitted and monitored on telemetry.  Cardiology   consultation has been obtained with Dr. Horne, case discussed with him.  The   patient will be started on aspirin.  He will be started on heparin drip.  We   will continue with his metoprolol and losartan.  We will continue with his   home dose of Lasix.    We will trend his troponins.    We will try to [facility].    We will monitor his renal function and electrolytes.    We will check blood cultures and urinalysis given his leukocytosis and monitor   his CBC.    He will be started on high dose atorvastatin.    Plan of care reviewed with the patient and his questions answered.    Code status discussed with the patient.  He would like to have chest   compressions and defibrillation, but does not wish to be intubated or   mechanically ventilated.    Patient will likely require more than 2 midnights stay for treatment of his   medical condition.       ____________________________________     MD LASHONDA OSULLIVAN / HENRY    DD:  10/08/2017 02:03:55  DT:  10/08/2017 02:26:10    D#:  [id number]  Job#:  [id number]

## 2017-10-08 NOTE — ASSESSMENT & PLAN NOTE
Uncontrolled    SSI-- increase Lantus  Holding metformin post cath, renal dysfunction - until improves.

## 2017-10-08 NOTE — ED PROVIDER NOTES
"ED Provider Note    Scribed for Lizabeth Hyde M.D. by Kellen Hernandez. 10/7/2017  7:50 PM    Means of arrival: ambulance  History obtained from: patient  History limited by: none      CHIEF COMPLAINT  Chief Complaint   Patient presents with   • Weakness   • Shortness of Breath       HPI  Roderick Sims is a 72 y.o. male with a history of ischemic cardiomyopathy, atrial fibrillation, DM, HTN who [facility] the [facility]. Patient was being evaluated there for intermittent shortness of breath onset last night with associated lightheadedness, weakness, mild cough, diarrhea (1x yesterday). Patient states his wife made him come in due to his diffuse weakness in his trouble getting around the house. He denies any recent history of chest pain. He is currently without complaints. Patient states he has history of a heart attack, however has never had stents placed and never undergone bypass surgery. Patient is compliant with all of his medications.  He takes apixiban for atrial fibrillation.  No complaints of chest pain, fever, chills, abdominal pain, leg swelling.    [facility], patient had EKG with nonspecific ST depressions in lateral leads had a troponin over 2. Chest x-ray was normal, CT head was negative. He was given aspirin, however heparin drip was not started.      REVIEW OF SYSTEMS  Pertinent positive include shortness of breath, lightheadedness, weakness, mild cough, diarrhea. Pertinent negative include chest pain, fever, chills, abdominal pain, leg swelling. All other systems reviewed and are negative.    PAST MEDICAL HISTORY   has a past medical history of CAD (coronary artery disease); Congestive heart failure (CMS-[facility]); Diabetes (CMS-HCC); Hypertension; MI (myocardial infarction) (\"1970's\"); and Stroke (CMS-[facility]).afib,     SOCIAL HISTORY  No pertinent social history    SURGICAL HISTORY   has a past surgical history that includes aicd " "implant.    CURRENT MEDICATIONS  Home Medications     Reviewed by Milvia Reyes, Pharmacy Intern (Pharmacy Intern) on 10/07/17 at 2024  Med List Status: Complete   Medication Last Dose Status   apixaban (ELIQUIS) 5mg Tab  Active   Cholecalciferol 95226 UNIT Cap  Active   fenofibrate (TRICOR) 54 MG tablet  Active   furosemide (LASIX) 20 MG Tab  Active   gabapentin (NEURONTIN) 300 MG Cap  Active   isosorbide mononitrate SR (IMDUR) 60 MG TABLET SR 24 HR  Active   losartan (COZAAR) 50 MG Tab  Active   metformin (GLUCOPHAGE) 1000 MG tablet  Active   metoprolol SR (TOPROL XL) 100 MG TABLET SR 24 HR  Active   ranitidine (ZANTAC) 150 MG Tab  Active   spironolactone (ALDACTONE) 25 MG Tab  Active                ALLERGIES  NKDA    PHYSICAL EXAM   VITAL SIGNS: Ht 1.702 m (5' 7\")   Wt 96.6 kg (213 lb)   BMI 33.36 kg/m²      Constitutional: Alert in no apparent distress.  Elderly pleasant male, chronically ill appearing  HENT: Normocephalic, Atraumatic. Bilateral external ears normal. Nose normal.   Eyes: Pupils are equal and reactive. Conjunctiva normal, non-icteric.   Neck: Supple, full range of motion  Heart: Pacemaker in place in left chest, Regular rate and rhythm, no murmurs.    Lungs: No respiratory distress, normal work of breathing, Lungs clear to auscultation bilaterally.  Abdomen Soft, non-tender, non-distended  Musculoskeletal: Full range of motion in all major joints. No obvious deformities notes.  Skin: Warm, Dry, No erythema, No rash.   Neurologic: Alert and oriented x3, moving all extremities spontaneously, Grossly non-focal.   Psychiatric: Affect normal, Mood normal, Appears appropriate and not intoxicated.      ED COURSE & MEDICAL DECISION MAKING  Patient Vitals for the past 24 hrs:   Height Weight   10/07/17 1947 1.702 m (5' 7\") 96.6 kg (213 lb)         Medications administered:  Medications   gabapentin (NEURONTIN) capsule 600 mg (not administered)   isosorbide mononitrate SR (IMDUR) tablet 60 mg (not " administered)   furosemide (LASIX) tablet 20 mg (not administered)   losartan (COZAAR) tablet 50 mg (not administered)   metoprolol SR (TOPROL XL) tablet 100 mg (not administered)   spironolactone (ALDACTONE) tablet 12.5 mg (not administered)   senna-docusate (PERICOLACE or SENOKOT S) 8.6-50 MG per tablet 2 Tab (not administered)     And   polyethylene glycol/lytes (MIRALAX) PACKET 1 Packet (not administered)     And   magnesium hydroxide (MILK OF MAGNESIA) suspension 30 mL (not administered)     And   bisacodyl (DULCOLAX) suppository 10 mg (not administered)   Respiratory Care per Protocol (not administered)   ondansetron (ZOFRAN) syringe/vial injection 4 mg (not administered)   ondansetron (ZOFRAN ODT) dispertab 4 mg (not administered)   acetaminophen (TYLENOL) tablet 650 mg (not administered)   Pharmacy Consult Request ...Pain Management Review (not administered)     And   oxycodone immediate-release (ROXICODONE) tablet 2.5 mg (not administered)     And   oxycodone immediate-release (ROXICODONE) tablet 5 mg (not administered)     And   HYDROmorphone (DILAUDID) injection 0.25 mg (not administered)   insulin lispro (HUMALOG) injection 2-9 Units (not administered)   glucose 4 g chewable tablet 16 g (not administered)     And   dextrose 50% (D50W) injection 25 mL (not administered)   famotidine (PEPCID) tablet 20 mg (not administered)   fenofibrate micronized (LOFIBRA) capsule 67 mg (not administered)   heparin 1000 units/mL injection 3,200 Units (not administered)     And   heparin infusion 25,000 units in 500 ml 0.45% nacl (1,200 Units/hr Intravenous New Bag.(Insulin or Heparin) 10/7/17 8655)         Labs and imaging personally reviewed:    LABS  Labs Reviewed   CBC WITH DIFFERENTIAL - Abnormal; Notable for the following:        Result Value    WBC 16.4 (*)     Lymphocytes 19.60 (*)     Immature Granulocytes 1.00 (*)     Neutrophils (Absolute) 10.98 (*)     Monos (Absolute) 1.64 (*)     Baso (Absolute) 0.13 (*)      Immature Granulocytes (abs) 0.17 (*)     All other components within normal limits    Narrative:     Indicate which anticoagulants the patient is on:->UNKNOWN  ** apixiban   COMP METABOLIC PANEL - Abnormal; Notable for the following:     Sodium 133 (*)     Co2 16 (*)     Anion Gap 12.0 (*)     Glucose 154 (*)     Bun 23 (*)     Creatinine 1.50 (*)     AST(SGOT) 67 (*)     ALT(SGPT) 55 (*)     All other components within normal limits    Narrative:     Indicate which anticoagulants the patient is on:->UNKNOWN  ** apixiban   TROPONIN - Abnormal; Notable for the following:     Troponin I 2.85 (*)     All other components within normal limits    Narrative:     Indicate which anticoagulants the patient is on:->UNKNOWN  ** apixiban   BTYPE NATRIURETIC PEPTIDE - Abnormal; Notable for the following:     B Natriuretic Peptide 330 (*)     All other components within normal limits    Narrative:     Indicate which anticoagulants the patient is on:->UNKNOWN  ** apixiban   PROTHROMBIN TIME - Abnormal; Notable for the following:     PT 19.1 (*)     INR 1.55 (*)     All other components within normal limits    Narrative:     Indicate which anticoagulants the patient is on:->UNKNOWN  ** apixiban   APTT - Abnormal; Notable for the following:     APTT 39.9 (*)     All other components within normal limits    Narrative:     Indicate which anticoagulants the patient is on:->UNKNOWN  ** apixiban   ESTIMATED GFR - Abnormal; Notable for the following:     GFR If  56 (*)     GFR If Non  46 (*)     All other components within normal limits    Narrative:     Indicate which anticoagulants the patient is on:->UNKNOWN  ** apixiban   URINALYSIS   BLOOD CULTURE   BLOOD CULTURE   BASIC METABOLIC PANEL   CBC WITH DIFFERENTIAL   TROPONIN   TROPONIN   [facility] POC GLUCOSE   [facility] POC GLUCOSE   [facility] POC GLUCOSE   [facility] POC GLUCOSE   [facility] POC GLUCOSE   [facility] POC GLUCOSE   [facility] POC GLUCOSE   [facility]  POC GLUCOSE   [facility] POC GLUCOSE     EKG  EKG personally reviewed by myself in the absence of a cardiologist showed:  NSR at 97  Normal axis  Normal intervals  Nonspecific conduction delay  ST depression in lateral leads  No ST elevation  No Twave changes    Old records personally reviewed:  Imaging from transferring facilities shows, CT of head was negative, chest xray negative.   Echo from 02/17 with an EF of 20-25%.     MDM:    7:50 PM Patient seen and examined at bedside. The patient presents with shortness of breath,. Ordered for EKG, PTT, PT/INR, CBC, CMP, troponin, BNP to evaluate.     Patient with history of coronary artery disease, ischemic cardiomyopathy with EF of 20-25%, pacemaker/AICD in place, atrial fibrillation on anticoagulation, [facility] and was found to have an NSTEMI with troponin of 2.  The patient was reassuring vitals on exam and no current complaints of chest pain. EKG here shows ST depressions in lateral leads without dynamic change, troponin remains elevated. Patient has already received aspirin and will be placed on a heparin drip at this time. History is not concerning for aortic dissection or pulmonary embolism. Chest x-ray reviewed from outside hospital without evidence of pneumonia, pneumothorax, pulmonary edema. BNP mildly elevated however no concern for decompensated heart failure.  Has mild acute kidney injury with associated mild transaminitis, unclear of baseline. Patient does have significant leukocytosis, however with no infectious symptoms. Will check urinalysis and continue to monitor.      8:07 PM Spoke with Dr. Pandey, Cardiology, about the patient's condition. Agrees to follow with possible intervention in the morning.    8:29 PM Spoke with Dr. Naz Carrion, hospitalist, about the patient's condition. Agrees to admit    Upon my evaluation, this patient had a high probability of imminent or life-threatening deterioration due to  NSTEMI which required my direct attention, intervention, and personal management.     I personally provided 35 minutes of total critical care time outside of time spent on separately billable/documented procedures. Time includes: review of laboratory data, review of radiology studies, discussion with consultants, discussion with family/patient, monitoring for potential decompensation.  Interventions were performed as documented above.           DISPOSITION:  Patient will be admitted to Dr. Nza Carrion, Hospitalist in guarded condition.      IMPRESSION  (I21.4) NSTEMI (non-ST elevated myocardial infarction) (CMS-HCC)    Results, diagnoses, and treatment options were discussed with the patient and/or family. Patient verbalized understanding of plan of care.     Kellen SPRAGUE (Scribe), am scribing for, and in the presence of, Lizabeth Hyde M.D..    Electronically signed by: Kellen Hernandez (Scribe), 10/7/2017    Lizabeth SPRAGUE M.D. personally performed the services described in this documentation, as scribed by Kellen Hernandez in my presence, and it is both accurate and complete.    The note accurately reflects work and decisions made by me.  Lizabeth Hyde  10/8/2017  1:44 AM

## 2017-10-08 NOTE — CONSULTS
Cardiology Consult Note:    Colt Horne  Date of Service:    10/7/2017   8:17 PM     Referring MD:  Dr. Hyde/ER    Patient ID:   Name:             Roderick Sims   YOB: 1945  Age:                 72 y.o.  male   MRN:               [id number]                                                             Chief Complaint:      Dyspnea and weakness    History of Present Illness:    73 y/o [facility] patient transferred here for c/o above CC and found elevated Troponin; He c/o dyspnea with orthopnea patterns x 2 nights; H/o CM and prior [facility] w/o detail information.   Also leukocytosis with left shift.  H/o MI 1977 and no intervention at the time; ICD implanted 5 yrs ago; He vaguely remember sometime ago but after ICD implantation, someone told him his natrium was not beating in synchrony with his ventricle; may have AFib w/o details but is not on anticogulant currently even CVA resulted Rt eye blindness about 1 yr ago.    EKG shows: SINUS RHYTHM   NONSPECIFIC INTRAVENTRICULAR CONDUCTION DELAY   BORDERLINE INFERIOR Q WAVES   BORDERLINE R WAVE PROGRESSION, ANTERIOR LEADS   MINIMAL ST DEPRESSION, LATERAL LEADS      Review of Systems:      Constitutional: Denies fevers, Denies weight changes  Eyes: Denies changes in vision, no eye pain  Ears/Nose/Throat/Mouth: Denies nasal congestion or sore throat   Cardiovascular: - chest pain, - palpitations   Respiratory: + shortness of breath , Denies cough  Gastrointestinal/Hepatic: Denies abdominal pain, nausea, vomiting, diarrhea, constipation or GI bleeding   Genitourinary: Denies dysuria or frequency  Musculoskeletal/Rheum: Denies  joint pain and swelling   Skin: Denies rash  Neurological: Denies headache, confusion, memory loss or focal weakness/parasthesias  Psychiatric: denies mood disorder   Endocrine: Rebecca thyroid problems  Heme/Oncology/Lymph Nodes: Denies enlarged lymph nodes, denies brusing or known bleeding disorder  All other systems were reviewed and are  "negative (AMA/CMS criteria)                Past Medical History:   No past medical history on file.  There are no active hospital problems to display for this patient.      Past Surgical History:  No past surgical history on file.    Hospital Medications:  No current facility-administered medications for this encounter.   No current outpatient prescriptions on file.    Current Outpatient Medications:    (Not in a hospital admission)    Medication Allergy:  Allergies not on file    Family History:  Mother MI at 70's .    Social History:  Social History     Social History   • Marital status:      Spouse name: N/A   • Number of children: N/A   • Years of education: N/A     Occupational History   • Not on file.     Social History Main Topics   • Smoking status: Not on file   • Smokeless tobacco: Not on file   • Alcohol use Not on file   • Drug use: Unknown   • Sexual activity: Not on file     Other Topics Concern   • Not on file     Social History Narrative   • No narrative on file         Physical Exam:  Vitals  Weight/BMI: Body mass index is 33.36 kg/m².  Pulse 99, height 1.702 m (5' 7\"), weight 96.6 kg (213 lb), SpO2 95 %.  Vitals:    10/07/17 1947 10/07/17 2009   Pulse:  99   SpO2:  95%   Weight: 96.6 kg (213 lb)    Height: 1.702 m (5' 7\")      Oxygen Therapy:  Pulse Oximetry: 95 %  General Appearance:  Obese; Well developed, Well nourished, No acute distress, Non-toxic appearance.   HENT:  Normocephalic, Atraumatic, Oropharynx moist mucous membranes, Dentition: , Nose normal.    Eyes:Rt eye partially blind x 1 yr from CVA;   PERRLA, EOMI, Conjunctiva normal, No discharge.  Neck:  Normal range of motion, No cervical tenderness, Supple, No stridor, no JVD .  No thyromegaly.  No carotid bruit.  Cardiovascular:  Normal heart rate, Normal rhythm, weak S1, S2, no S3,  S4; No gallops; No murmurs, No rubs, .   Extremitites with intact distal pulses, no cyanosis, clubbing or edema.  No heaves, thrills, " HJR;  Peripheral pulses: carotid 2+, brachial 2+, radial 2+, ulnar 2+, femoral 2+, popliteal 2+, PT 2+, DP 2+;  Lungs:  Respiratory effort is normal. Normal breath sounds, breath sounds clear to auscultation bilaterally,  no rales, no rhonchi, no wheezing.   Abdomen: Bowel sounds normal, Soft, No tenderness, No guarding, No rebound, No masses, No hepatosplenomegaly.  Skin: Warm, Dry, No erythema, No rash, no induration or crepitus.  Neurologic: Alert & oriented x 3, Normal motor function, Normal sensory function, + focal deficits noted, Rt visual loss, o/w cranial nerves II through XII are normal, .  Psychiatric: Affect normal, Judgment normal, Mood normal.      MDM (Data Review):     Records reviewed and summarized in current documentation    Lab Data Review:  Recent Results (from the past 24 hour(s))   CBC WITH DIFFERENTIAL    Collection Time: 10/07/17  7:45 PM   Result Value Ref Range    WBC 16.4 (H) 4.8 - 10.8 K/uL    RBC 4.71 4.70 - 6.10 M/uL    Hemoglobin 14.6 14.0 - 18.0 g/dL    Hematocrit 42.0 42.0 - 52.0 %    MCV 89.2 81.4 - 97.8 fL    MCH 31.0 27.0 - 33.0 pg    MCHC 34.8 33.7 - 35.3 g/dL    RDW 42.8 35.9 - 50.0 fL    Platelet Count 169 164 - 446 K/uL    MPV 11.6 9.0 - 12.9 fL    Neutrophils-Polys 67.20 44.00 - 72.00 %    Lymphocytes 19.60 (L) 22.00 - 41.00 %    Monocytes 10.00 0.00 - 13.40 %    Eosinophils 1.40 0.00 - 6.90 %    Basophils 0.80 0.00 - 1.80 %    Immature Granulocytes 1.00 (H) 0.00 - 0.90 %    Nucleated RBC 0.00 /100 WBC    Neutrophils (Absolute) 10.98 (H) 1.82 - 7.42 K/uL    Lymphs (Absolute) 3.20 1.00 - 4.80 K/uL    Monos (Absolute) 1.64 (H) 0.00 - 0.85 K/uL    Eos (Absolute) 0.23 0.00 - 0.51 K/uL    Baso (Absolute) 0.13 (H) 0.00 - 0.12 K/uL    Immature Granulocytes (abs) 0.17 (H) 0.00 - 0.11 K/uL    NRBC (Absolute) 0.00 K/uL   EKG (ER)    Collection Time: 10/07/17  8:01 PM   Result Value Ref Range    Report       [facility] Emergency Dept.    Test Date:  2017-10-07  Pt  Name:    HENRY WRIGHT                  Department: ER  MRN:        [id number]                      Room:        04  Gender:     M                            Technician: [id number]  :        1945                   Requested By:MARY PEPPER  Order #:    [id number]                    Reading MD:    Measurements  Intervals                                Axis  Rate:       97                           P:          29  MO:         176                          QRS:        44  QRSD:       110                          T:          232  QT:         364  QTc:        463    Interpretive Statements  SINUS RHYTHM  NONSPECIFIC INTRAVENTRICULAR CONDUCTION DELAY  BORDERLINE INFERIOR Q WAVES  BORDERLINE R WAVE PROGRESSION, ANTERIOR LEADS  MINIMAL ST DEPRESSION, LATERAL LEADS  No previous ECG available for comparison     Results for HENRY WRIGHT (MRN [id number]) as of 10/7/2017 21:10   Ref. Range 10/7/2017 19:45 10/7/2017 20:01   Sodium Latest Ref Range: 135 - 145 mmol/L 133 (L)    Potassium Latest Ref Range: 3.6 - 5.5 mmol/L 3.9    Chloride Latest Ref Range: 96 - 112 mmol/L 105    Co2 Latest Ref Range: 20 - 33 mmol/L 16 (L)    Anion Gap Latest Ref Range: 0.0 - 11.9  12.0 (H)    Glucose Latest Ref Range: 65 - 99 mg/dL 154 (H)    Bun Latest Ref Range: 8 - 22 mg/dL 23 (H)    Creatinine Latest Ref Range: 0.50 - 1.40 mg/dL 1.50 (H)    GFR If  Latest Ref Range: >60 mL/min/1.73 m 2 56 (A)    GFR If Non  Latest Ref Range: >60 mL/min/1.73 m 2 46 (A)    Calcium Latest Ref Range: 8.5 - 10.5 mg/dL 8.8    AST(SGOT) Latest Ref Range: 12 - 45 U/L 67 (H)    ALT(SGPT) Latest Ref Range: 2 - 50 U/L 55 (H)    Alkaline Phosphatase Latest Ref Range: 30 - 99 U/L 86    Total Bilirubin Latest Ref Range: 0.1 - 1.5 mg/dL 1.3    Albumin Latest Ref Range: 3.2 - 4.9 g/dL 3.4    Total Protein Latest Ref Range: 6.0 - 8.2 g/dL 6.8    Globulin Latest Ref Range: 1.9 - 3.5 g/dL 3.4    A-G Ratio Latest Units: g/dL 1.0    Troponin I Latest  Ref Range: 0.00 - 0.04 ng/mL 2.85 (H)    B Natriuretic Peptide Latest Ref Range: 0 - 100 pg/mL 330 (H)    PT Latest Ref Range: 12.0 - 14.6 sec 19.1 (H)    INR Latest Ref Range: 0.87 - 1.13  1.55 (H)    APTT Latest Ref Range: 24.7 - 36.0 sec 39.9 (H)        Imaging/Procedures Review:    Chest Xray:  Reviewed    EKG:   As in HPI. See above    MDM (Assessment and Plan):     NSTEMI  CM?  Orthopnea  Leukocytosis  Remote smoking history (quitted 57 yrs ago)  DM  HTN  HLD    STAT Echo  ASA, UFH, high intensity Statin  Pls trend Trop and EKG  Evaluate leukocytosis and left shift  Med record to review prior cardiac w/u's  Case discussed with attending/RN  Will follow  Thx

## 2017-10-08 NOTE — PROGRESS NOTES
EMERGENCY TRANSPORT INFORMATION    Patient:  Mario Aguila   :  3/10/1950   Address:  [address] [place] [address]  Phone:  [phone] (home)    Insurance:  Payor: MEDICARE / Plan: MEDICARE PART B ONLY / Product Type: *No Product t Patient is sleeping comfortably in bed, no signs of distress, even unlabored breathing, will continue to monitor.

## 2017-10-08 NOTE — RESPIRATORY CARE
COPD EDUCATION by COPD CLINICAL EDUCATOR  10/8/2017 at 8:12 AM by Kristen Valdez     Patient reviewed by COPD education team. Patient does not qualify for COPD program.

## 2017-10-08 NOTE — CARE PLAN
Problem: Communication  Goal: The ability to communicate needs accurately and effectively will improve    Intervention: Educate patient and significant other/support system about the plan of care, procedures, treatments, medications and allow for questions  Listened to patients discussion of concerns related to disease process and treatment plan. Discussed with patient concerns, goals and management. Discussed importance of patient reporting new signs and symptoms related to disease process. Patient verbalized understanding and understands importance of communicating needs.  Supported and educated patient by addressing specific questions regarding diagnosis, risks, benefits of pain management treatment.                Problem: Safety  Goal: Will remain free from falls    Intervention: Assess risk factors for falls  Educated patient on use of call light, no slip socks on, bed lowest position. All needs attended to. Patient verbalized understanding.

## 2017-10-08 NOTE — PROGRESS NOTES
[facility] Hospitalist Progress Note    Date of Service: 10/8/2017    Chief Complaint  72 y.o. male admitted 10/7/2017 with NSTEMI.    Interval Problem Update  NSTEMI - no cp, symptoms were dyspnea  HTN - controlled  Diabetes - not controlled  Renal failure - acute vs chronic    Consultants/Specialty  Cardio - L[place]ette    Disposition  For angiogram        Review of Systems   Constitutional: Negative for chills and fever.   HENT: Negative for sore throat.    Eyes: Negative for blurred vision.   Respiratory: Negative for cough, shortness of breath and wheezing.    Cardiovascular: Negative for chest pain and leg swelling.   Gastrointestinal: Negative for abdominal pain, diarrhea, heartburn, nausea and vomiting.   Genitourinary: Negative for dysuria.   Neurological: Negative for dizziness and headaches.      Physical Exam  Laboratory/Imaging   Hemodynamics  Temp (24hrs), Av.3 °C (97.4 °F), Min:36.2 °C (97.2 °F), Max:36.4 °C (97.6 °F)   Temperature: 36.2 °C (97.2 °F)  Pulse  Av  Min: 97  Max: 103    Blood Pressure : 155/91 (RN Preethi notified), NIBP: 152/75      Respiratory      Respiration: 18, Pulse Oximetry: 93 %, O2 Daily Delivery Respiratory : Room Air with O2 Available        RUL Breath Sounds: Clear, RML Breath Sounds: Clear, RLL Breath Sounds: Clear, CORBY Breath Sounds: Fine Crackles, LLL Breath Sounds: Clear    Fluids    Intake/Output Summary (Last 24 hours) at 10/08/17 1242  Last data filed at 10/08/17 0200   Gross per 24 hour   Intake                0 ml   Output              250 ml   Net             -250 ml       Nutrition  Orders Placed This Encounter   Procedures   • Diet Order     Standing Status:   Standing     Number of Occurrences:   1     Order Specific Question:   Diet:     Answer:   Cardiac [6]     Order Specific Question:   Diet:     Answer:   Diabetic [3]   • Diet NPO after Midnight     Standing Status:   Standing     Number of Occurrences:   8     Order Specific Question:   Restrict to:      Answer:   Sips with Medications [3]     Physical Exam   Constitutional: He is oriented to person, place, and time. He appears well-developed and well-nourished.   HENT:   Head: Normocephalic and atraumatic.   Eyes: Conjunctivae are normal. Pupils are equal, round, and reactive to light.   Neck: No tracheal deviation present. No thyromegaly present.   Cardiovascular: Normal rate and regular rhythm.    Pulmonary/Chest: Effort normal and breath sounds normal.   Abdominal: Soft. Bowel sounds are normal. He exhibits no distension. There is no tenderness.   Lymphadenopathy:     He has no cervical adenopathy.   Neurological: He is alert and oriented to person, place, and time.   Skin: Skin is warm and dry.   Nursing note and vitals reviewed.      Recent Labs      10/07/17   1945  10/08/17   1129   WBC  16.4*  13.3*   RBC  4.71  4.61*   HEMOGLOBIN  14.6  14.3   HEMATOCRIT  42.0  41.6*   MCV  89.2  90.2   MCH  31.0  31.0   MCHC  34.8  34.4   RDW  42.8  43.6   PLATELETCT  169  164   MPV  11.6  11.3     Recent Labs      10/07/17   1945  10/08/17   1129   SODIUM  133*  135   POTASSIUM  3.9  3.9   CHLORIDE  105  105   CO2  16*  21   GLUCOSE  154*  194*   BUN  23*  23*   CREATININE  1.50*  1.35   CALCIUM  8.8  8.3*     Recent Labs      10/07/17   1945  10/08/17   0528  10/08/17   1130   APTT  39.9*  82.7*  83.4*   INR  1.55*   --   1.51*     Recent Labs      10/07/17   1945   BNPBTYPENAT  330*              Assessment/Plan     * NSTEMI (non-ST elevated myocardial infarction) (CMS-HCC)   Assessment & Plan    ASA, Crestor  For angiogram        AICD (automatic cardioverter/defibrillator) present- (present on admission)   Assessment & Plan    stable        Renal failure- (present on admission)   Assessment & Plan    IVF NS, follow bmp, check PTH        Coagulopathy (CMS-HCC)- (present on admission)   Assessment & Plan    Follow INR        Cardiomyopathy (CMS-HCC)- (present on admission)   Assessment & Plan    Metoprolol, Cozaar,  Lasix, Aldactone        Paroxysmal atrial fibrillation (CMS-HCC)- (present on admission)   Assessment & Plan    Coreg, holding Eliquis        Chronic systolic CHF (congestive heart failure) (CMS-HCC)- (present on admission)   Assessment & Plan    Metoprolol, Cozaar, Lasix, Aldactone        HTN (hypertension)- (present on admission)   Assessment & Plan    Metoprolol, Cozaar        Diabetes mellitus (CMS-HCC)- (present on admission)   Assessment & Plan    SSI        Dyslipidemia- (present on admission)   Assessment & Plan    Crestor            Reviewed items::  EKG reviewed, Radiology images reviewed, Labs reviewed and Medications reviewed  Garcia catheter::  No Garcia  DVT prophylaxis - mechanical:  SCDs  Ulcer Prophylaxis::  No

## 2017-10-08 NOTE — PROGRESS NOTES
Two RN skin check with Abilio: Scattered scabs throughout trunk and back, pt states from scratching. Bilateral heels dry and flaky, red and blanchable. All other areas of skin assessed, no areas of concern.

## 2017-10-08 NOTE — CATH LAB
Immediate Post-Operative Note      PreOp Diagnosis: Ischemic cardiomyopathy    PostOp Diagnosis: same    Procedure(s) :  Coronary Angiography, Left Heart Catheterization, Left Ventriculography. R radial approach    Surgeon(s):  Henrry Anglin M.D.    Type of Anesthesia: Moderate Sedation    Specimen: None    Estimated Blood Loss: 10 cc's    Contrast Media:  100 cc's    Fluoro Time: 2.2 min    Xray DAP: 69102    Findings: L dominant system.  100% mid LAD, 90% prox Circ, distal circ occluded.  LAD and PDA of Circ fill via collateral. 70% proximal ALESSANDRO  Severely depressed LV at 23%.    Complications: none      Henrry Anglin M.D.  10/8/2017 2:31 PM

## 2017-10-08 NOTE — ED NOTES
Pt weighed on standing scale, pharmacy notified of weight change, heparin not initiated until approved by Pharm MD.

## 2017-10-09 ENCOUNTER — TELEPHONE (OUTPATIENT)
Dept: CARDIOLOGY | Facility: MEDICAL CENTER | Age: 72
End: 2017-10-09

## 2017-10-09 PROBLEM — E87.6 HYPOKALEMIA: Status: ACTIVE | Noted: 2017-10-09

## 2017-10-09 PROBLEM — N18.30 CKD (CHRONIC KIDNEY DISEASE) STAGE 3, GFR 30-59 ML/MIN: Status: ACTIVE | Noted: 2017-10-09

## 2017-10-09 LAB
ANION GAP SERPL CALC-SCNC: 12 MMOL/L (ref 0–11.9)
APTT PPP: 34.5 SEC (ref 24.7–36)
BASOPHILS # BLD AUTO: 0.8 % (ref 0–1.8)
BASOPHILS # BLD: 0.11 K/UL (ref 0–0.12)
BNP SERPL-MCNC: 601 PG/ML (ref 0–100)
BUN SERPL-MCNC: 21 MG/DL (ref 8–22)
CALCIUM SERPL-MCNC: 8.9 MG/DL (ref 8.5–10.5)
CHLORIDE SERPL-SCNC: 103 MMOL/L (ref 96–112)
CO2 SERPL-SCNC: 20 MMOL/L (ref 20–33)
CREAT SERPL-MCNC: 1.46 MG/DL (ref 0.5–1.4)
EOSINOPHIL # BLD AUTO: 0.32 K/UL (ref 0–0.51)
EOSINOPHIL NFR BLD: 2.2 % (ref 0–6.9)
ERYTHROCYTE [DISTWIDTH] IN BLOOD BY AUTOMATED COUNT: 44.9 FL (ref 35.9–50)
GFR SERPL CREATININE-BSD FRML MDRD: 47 ML/MIN/1.73 M 2
GLUCOSE BLD-MCNC: 186 MG/DL (ref 65–99)
GLUCOSE BLD-MCNC: 201 MG/DL (ref 65–99)
GLUCOSE BLD-MCNC: 207 MG/DL (ref 65–99)
GLUCOSE BLD-MCNC: 279 MG/DL (ref 65–99)
GLUCOSE SERPL-MCNC: 181 MG/DL (ref 65–99)
HCT VFR BLD AUTO: 43.3 % (ref 42–52)
HGB BLD-MCNC: 14.7 G/DL (ref 14–18)
IMM GRANULOCYTES # BLD AUTO: 0.16 K/UL (ref 0–0.11)
IMM GRANULOCYTES NFR BLD AUTO: 1.1 % (ref 0–0.9)
LYMPHOCYTES # BLD AUTO: 3.6 K/UL (ref 1–4.8)
LYMPHOCYTES NFR BLD: 25.2 % (ref 22–41)
MCH RBC QN AUTO: 31.1 PG (ref 27–33)
MCHC RBC AUTO-ENTMCNC: 33.9 G/DL (ref 33.7–35.3)
MCV RBC AUTO: 91.5 FL (ref 81.4–97.8)
MONOCYTES # BLD AUTO: 1.31 K/UL (ref 0–0.85)
MONOCYTES NFR BLD AUTO: 9.2 % (ref 0–13.4)
NEUTROPHILS # BLD AUTO: 8.79 K/UL (ref 1.82–7.42)
NEUTROPHILS NFR BLD: 61.5 % (ref 44–72)
NRBC # BLD AUTO: 0 K/UL
NRBC BLD AUTO-RTO: 0 /100 WBC
PLATELET # BLD AUTO: 188 K/UL (ref 164–446)
PMV BLD AUTO: 11.6 FL (ref 9–12.9)
POTASSIUM SERPL-SCNC: 3.5 MMOL/L (ref 3.6–5.5)
RBC # BLD AUTO: 4.73 M/UL (ref 4.7–6.1)
SODIUM SERPL-SCNC: 135 MMOL/L (ref 135–145)
TSH SERPL DL<=0.005 MIU/L-ACNC: 3.46 UIU/ML (ref 0.3–3.7)
WBC # BLD AUTO: 14.3 K/UL (ref 4.8–10.8)

## 2017-10-09 PROCEDURE — 700102 HCHG RX REV CODE 250 W/ 637 OVERRIDE(OP): Performed by: INTERNAL MEDICINE

## 2017-10-09 PROCEDURE — 99232 SBSQ HOSP IP/OBS MODERATE 35: CPT | Performed by: FAMILY MEDICINE

## 2017-10-09 PROCEDURE — A9270 NON-COVERED ITEM OR SERVICE: HCPCS

## 2017-10-09 PROCEDURE — 700102 HCHG RX REV CODE 250 W/ 637 OVERRIDE(OP): Performed by: HOSPITALIST

## 2017-10-09 PROCEDURE — A9270 NON-COVERED ITEM OR SERVICE: HCPCS | Performed by: HOSPITALIST

## 2017-10-09 PROCEDURE — 770020 HCHG ROOM/CARE - TELE (206)

## 2017-10-09 PROCEDURE — 83880 ASSAY OF NATRIURETIC PEPTIDE: CPT

## 2017-10-09 PROCEDURE — 85025 COMPLETE CBC W/AUTO DIFF WBC: CPT

## 2017-10-09 PROCEDURE — A9270 NON-COVERED ITEM OR SERVICE: HCPCS | Performed by: FAMILY MEDICINE

## 2017-10-09 PROCEDURE — 85730 THROMBOPLASTIN TIME PARTIAL: CPT

## 2017-10-09 PROCEDURE — 36415 COLL VENOUS BLD VENIPUNCTURE: CPT

## 2017-10-09 PROCEDURE — 700102 HCHG RX REV CODE 250 W/ 637 OVERRIDE(OP): Performed by: FAMILY MEDICINE

## 2017-10-09 PROCEDURE — 82962 GLUCOSE BLOOD TEST: CPT

## 2017-10-09 PROCEDURE — 84443 ASSAY THYROID STIM HORMONE: CPT

## 2017-10-09 PROCEDURE — 80048 BASIC METABOLIC PNL TOTAL CA: CPT

## 2017-10-09 PROCEDURE — A9270 NON-COVERED ITEM OR SERVICE: HCPCS | Performed by: INTERNAL MEDICINE

## 2017-10-09 PROCEDURE — 700102 HCHG RX REV CODE 250 W/ 637 OVERRIDE(OP)

## 2017-10-09 RX ORDER — POTASSIUM CHLORIDE 20 MEQ/1
20 TABLET, EXTENDED RELEASE ORAL DAILY
Status: DISCONTINUED | OUTPATIENT
Start: 2017-10-09 | End: 2017-10-11

## 2017-10-09 RX ORDER — INSULIN GLARGINE 100 [IU]/ML
10 INJECTION, SOLUTION SUBCUTANEOUS
Status: DISCONTINUED | OUTPATIENT
Start: 2017-10-10 | End: 2017-10-11

## 2017-10-09 RX ADMIN — CLOPIDOGREL 75 MG: 75 TABLET, FILM COATED ORAL at 10:09

## 2017-10-09 RX ADMIN — INSULIN LISPRO 3 UNITS: 100 INJECTION, SOLUTION INTRAVENOUS; SUBCUTANEOUS at 13:02

## 2017-10-09 RX ADMIN — STANDARDIZED SENNA CONCENTRATE AND DOCUSATE SODIUM 2 TABLET: 8.6; 5 TABLET, FILM COATED ORAL at 10:16

## 2017-10-09 RX ADMIN — ISOSORBIDE MONONITRATE 60 MG: 30 TABLET, EXTENDED RELEASE ORAL at 10:11

## 2017-10-09 RX ADMIN — INSULIN LISPRO 3 UNITS: 100 INJECTION, SOLUTION INTRAVENOUS; SUBCUTANEOUS at 18:02

## 2017-10-09 RX ADMIN — ASPIRIN 81 MG: 81 TABLET, COATED ORAL at 10:11

## 2017-10-09 RX ADMIN — POTASSIUM CHLORIDE 20 MEQ: 1500 TABLET, EXTENDED RELEASE ORAL at 10:10

## 2017-10-09 RX ADMIN — METOPROLOL SUCCINATE 100 MG: 50 TABLET, EXTENDED RELEASE ORAL at 10:10

## 2017-10-09 RX ADMIN — LOSARTAN POTASSIUM 50 MG: 50 TABLET, FILM COATED ORAL at 10:10

## 2017-10-09 RX ADMIN — INSULIN LISPRO 5 UNITS: 100 INJECTION, SOLUTION INTRAVENOUS; SUBCUTANEOUS at 23:06

## 2017-10-09 RX ADMIN — INSULIN LISPRO 2 UNITS: 100 INJECTION, SOLUTION INTRAVENOUS; SUBCUTANEOUS at 06:14

## 2017-10-09 RX ADMIN — FAMOTIDINE 20 MG: 20 TABLET, FILM COATED ORAL at 10:10

## 2017-10-09 RX ADMIN — GABAPENTIN 600 MG: 300 CAPSULE ORAL at 10:10

## 2017-10-09 RX ADMIN — FUROSEMIDE 20 MG: 20 TABLET ORAL at 10:10

## 2017-10-09 ASSESSMENT — ENCOUNTER SYMPTOMS
SHORTNESS OF BREATH: 0
DIZZINESS: 0
PALPITATIONS: 0
FEVER: 0
HEADACHES: 0
ORTHOPNEA: 0
CHILLS: 0
VOMITING: 0
CLAUDICATION: 0
SORE THROAT: 0
ABDOMINAL PAIN: 0
WHEEZING: 0
COUGH: 0
DIARRHEA: 0
NAUSEA: 0
HEARTBURN: 0
BLURRED VISION: 0
PND: 0

## 2017-10-09 ASSESSMENT — PAIN SCALES - GENERAL
PAINLEVEL_OUTOF10: 0

## 2017-10-09 NOTE — PROGRESS NOTES
Received report from day shift nurse that heparin drip was discontinued. Heparin drip still on MAR. Called cardiology, spoke with Dr. Phillips to clarify orders. Explained that pt had been on heparin drip prior to cardiac cath and was off of it when he returned to the floor. Received order to discontinue heparin drip in the MAR.

## 2017-10-09 NOTE — CARE PLAN
Problem: Safety  Goal: Will remain free from injury  Outcome: PROGRESSING AS EXPECTED  Patient uses call bell appropriately. Bed locked in lowest position, call light in reach, bed alarm on, non skid footwear on. Hourly rounds in practice.     Problem: Knowledge Deficit  Goal: Knowledge of disease process/condition, treatment plan, diagnostic tests, and medications will improve  Outcome: PROGRESSING AS EXPECTED  Cardiac cath education provided to patient and family. Patient expressed understanding.

## 2017-10-09 NOTE — PROGRESS NOTES
Patient becoming forgetful, repeatedly asking about why he is here and what is going on, easily reoriented.  Granddaughter at bedside, discussed her not able to stay the night in room as is a semi-private, she verbalized understanding and stated she will stay in lobby.  Bed alarm on and call light in reach.

## 2017-10-09 NOTE — CARE PLAN
Problem: Safety  Goal: Will remain free from falls  Outcome: PROGRESSING AS EXPECTED  Bed locked in lowest position, call light in reach, bed alarm on, Pt uses call light appropriately. Hourly rounding being done to ensure all patient needs are met and fall risk is reduced.      Problem: Knowledge Deficit  Goal: Knowledge of disease process/condition, treatment plan, diagnostic tests, and medications will improve  Outcome: PROGRESSING AS EXPECTED  Patient educated on extent of heart disease and possible treatment plans. Family at bedside, patient expressed understanding options of potentially not being an open heart candidate.

## 2017-10-09 NOTE — PROGRESS NOTES
Bedside report completed. Assumed patient care. Pt sitting up in chair, denies pain. Increased confusion overnight, AOx4 this morning but slightly forgetful. Discussed POC of possible cardiac cath today. Family at bedside. Bed locked in lowest position, call light in reach, chair alarm on. All patient needs met at this time.

## 2017-10-09 NOTE — TELEPHONE ENCOUNTER
[facility], It is [facility] today ([place] day), unable to obtain medical records, will try again tomorrow

## 2017-10-09 NOTE — PROGRESS NOTES
Pt up frequently, setting off bed alarm. Confused, thinking it is morning. Reoriented and assisted back into bed.

## 2017-10-09 NOTE — CARE PLAN
Problem: Nutritional:  Goal: Patient to verbalize or demonstrate understanding of diet  Outcome: MET Date Met: 10/09/17  Provided pt family with cardiac diet education handout.

## 2017-10-09 NOTE — PROGRESS NOTES
[facility] Hospitalist Progress Note    Date of Service: 10/9/2017    Chief Complaint  72 y.o. male admitted 10/7/2017 with NSTEMI.    Interval Problem Update  NSTEMI - no cp, symptoms were dyspnea, angiogram showed severe 2v CAD  HTN - controlled  Diabetes - not controlled  Renal failure - appears to be chronic    Consultants/Specialty  Cardio - [place]ette    Disposition  Pending further Cardiology recommendations        Review of Systems   Constitutional: Negative for chills and fever.   HENT: Negative for sore throat.    Eyes: Negative for blurred vision.   Respiratory: Negative for cough, shortness of breath and wheezing.    Cardiovascular: Negative for chest pain and leg swelling.   Gastrointestinal: Negative for abdominal pain, diarrhea, heartburn, nausea and vomiting.   Genitourinary: Negative for dysuria.   Neurological: Negative for dizziness and headaches.      Physical Exam  Laboratory/Imaging   Hemodynamics  Temp (24hrs), Av.6 °C (97.8 °F), Min:36.2 °C (97.2 °F), Max:37.1 °C (98.8 °F)   Temperature: 37.1 °C (98.8 °F)  Pulse  Av.6  Min: 75  Max: 103    Blood Pressure : 138/88      Respiratory      Respiration: 18, Pulse Oximetry: 98 %        RUL Breath Sounds: Clear, RML Breath Sounds: Clear, RLL Breath Sounds: Fine Crackles, CORBY Breath Sounds: Clear, LLL Breath Sounds: Fine Crackles    Fluids    Intake/Output Summary (Last 24 hours) at 10/09/17 1504  Last data filed at 10/09/17 0250   Gross per 24 hour   Intake              360 ml   Output             1025 ml   Net             -665 ml       Nutrition  Orders Placed This Encounter   Procedures   • DIET ORDER     Standing Status:   Standing     Number of Occurrences:   1     Order Specific Question:   Diet:     Answer:   Diabetic [3]     Order Specific Question:   Diet:     Answer:   Cardiac [6]     Physical Exam   Constitutional: He is oriented to person, place, and time. He appears well-developed and well-nourished.   HENT:   Head: Normocephalic and  atraumatic.   Eyes: Conjunctivae are normal. Pupils are equal, round, and reactive to light.   Neck: No tracheal deviation present. No thyromegaly present.   Cardiovascular: Normal rate and regular rhythm.    Pulmonary/Chest: Effort normal and breath sounds normal.   Abdominal: Soft. Bowel sounds are normal. He exhibits no distension. There is no tenderness.   Lymphadenopathy:     He has no cervical adenopathy.   Neurological: He is alert and oriented to person, place, and time.   Skin: Skin is warm and dry.   Nursing note and vitals reviewed.      Recent Labs      10/07/17   1945  10/08/17   1129  10/09/17   0257   WBC  16.4*  13.3*  14.3*   RBC  4.71  4.61*  4.73   HEMOGLOBIN  14.6  14.3  14.7   HEMATOCRIT  42.0  41.6*  43.3   MCV  89.2  90.2  91.5   MCH  31.0  31.0  31.1   MCHC  34.8  34.4  33.9   RDW  42.8  43.6  44.9   PLATELETCT  169  164  188   MPV  11.6  11.3  11.6     Recent Labs      10/07/17   1945  10/08/17   1129  10/09/17   0257   SODIUM  133*  135  135   POTASSIUM  3.9  3.9  3.5*   CHLORIDE  105  105  103   CO2  16*  21  20   GLUCOSE  154*  194*  181*   BUN  23*  23*  21   CREATININE  1.50*  1.35  1.46*   CALCIUM  8.8  8.3*  8.9     Recent Labs      10/07/17   1945  10/08/17   0528  10/08/17   1130  10/09/17   0257   APTT  39.9*  82.7*  83.4*  34.5   INR  1.55*   --   1.51*   --      Recent Labs      10/07/17   1945  10/09/17   0257   BNPBTYPENAT  330*  601*              Assessment/Plan     * NSTEMI (non-ST elevated myocardial infarction) (CMS-[facility])   Assessment & Plan    ASA, Crestor  Severe 2v CAD        CKD (chronic kidney disease) stage 3, GFR 30-59 ml/min- (present on admission)   Assessment & Plan    Follow bmp        Hypokalemia   Assessment & Plan    Start Kdur, follow, bmp, Mg, Ph        AICD (automatic cardioverter/defibrillator) present- (present on admission)   Assessment & Plan    stable        Coagulopathy (CMS-HCC)- (present on admission)   Assessment & Plan    Follow INR         Cardiomyopathy (CMS-HCC)- (present on admission)   Assessment & Plan    Metoprolol, Cozaar, Lasix, Aldactone        Paroxysmal atrial fibrillation (CMS-HCC)- (present on admission)   Assessment & Plan    Coreg, holding Eliquis        Chronic systolic CHF (congestive heart failure) (CMS-HCC)- (present on admission)   Assessment & Plan    Metoprolol, Cozaar, Lasix, Aldactone        HTN (hypertension)- (present on admission)   Assessment & Plan    Metoprolol, Cozaar        Diabetes mellitus (CMS-HCC)- (present on admission)   Assessment & Plan    SSI, start Lantus 10 u        Dyslipidemia- (present on admission)   Assessment & Plan    Crestor            Reviewed items::  EKG reviewed, Radiology images reviewed, Labs reviewed and Medications reviewed  Garcia catheter::  No Garcia  DVT prophylaxis - mechanical:  SCDs  Ulcer Prophylaxis::  No

## 2017-10-09 NOTE — PROGRESS NOTES
Cardiology Progress Note               Author: Goldy Parra Date & Time created: 10/9/2017  10:29 AM     Consultation for non-STEMI (troponin peaked at 3 )    Admitted with: [facility], [facility] dizziness        History of coronary artery disease, ischemic cardiomyopathy, EF 20%, chronic atrial fibrillation, receives care at the [facility], hypertension, diabetes, CVA, AICD implantation (5 years ago)          Labs reviewed   K = 3.5  CR = 1.46  Troponin peaked at 3        BP = 150/90  HR = 89 NSR       Coronary angiography 10/8/17 reveal 100% mid LAD, 90% proximal circumflex, distal circumflex occluded, LAD and PTCA of circumflex fill via collateral, 70% proximal ALESSANDRO, EF 23%    Review of Systems   Respiratory: Negative for cough and shortness of breath.    Cardiovascular: Negative for chest pain, palpitations, orthopnea, claudication, leg swelling and PND.       Physical Exam   Constitutional: He is oriented to person, place, and time.   HENT:   Head: Normocephalic.   Neck: No JVD present. No thyromegaly present.   Cardiovascular: Normal rate and regular rhythm.    Pulses:       Carotid pulses are 2+ on the right side, and 2+ on the left side.       Radial pulses are 2+ on the right side, and 2+ on the left side.   Pulmonary/Chest: He has no wheezes.   Abdominal: Soft.   Musculoskeletal: He exhibits no edema.   Neurological: He is alert and oriented to person, place, and time.   Skin: Skin is warm and dry.   Right radial Side pain/stress/intact  Good circulation       Hemodynamics:  Temp (24hrs), Av.5 °C (97.7 °F), Min:36.2 °C (97.1 °F), Max:36.7 °C (98.1 °F)  Temperature: 36.2 °C (97.2 °F)  Pulse  Av.6  Min: 75  Max: 103   Blood Pressure : 150/93     Respiratory:    Respiration: 19, Pulse Oximetry: 96 %        RUL Breath Sounds: Clear, RML Breath Sounds: Clear, RLL Breath Sounds: Fine Crackles, CORBY Breath Sounds: Clear, LLL Breath Sounds: Fine Crackles  Fluids:      Weight: 104 kg (229 lb 4.5 oz)  GI/Nutrition:  Orders Placed This Encounter   Procedures   • DIET NPO     Standing Status:   Standing     Number of Occurrences:   1     Order Specific Question:   Restrict to:     Answer:   Sips with Medications [3]     Lab Results:  Recent Labs      10/07/17   1945  10/08/17   1129  10/09/17   0257   WBC  16.4*  13.3*  14.3*   RBC  4.71  4.61*  4.73   HEMOGLOBIN  14.6  14.3  14.7   HEMATOCRIT  42.0  41.6*  43.3   MCV  89.2  90.2  91.5   MCH  31.0  31.0  31.1   MCHC  34.8  34.4  33.9   RDW  42.8  43.6  44.9   PLATELETCT  169  164  188   MPV  11.6  11.3  11.6     Recent Labs      10/07/17   1945  10/08/17   1129  10/09/17   0257   SODIUM  133*  135  135   POTASSIUM  3.9  3.9  3.5*   CHLORIDE  105  105  103   CO2  16*  21  20   GLUCOSE  154*  194*  181*   BUN  23*  23*  21   CREATININE  1.50*  1.35  1.46*   CALCIUM  8.8  8.3*  8.9     Recent Labs      10/07/17   1945  10/08/17   0528  10/08/17   1130  10/09/17   0257   APTT  39.9*  82.7*  83.4*  34.5   INR  1.55*   --   1.51*   --      Recent Labs      10/07/17   1945  10/09/17   0257   BNPBTYPENAT  330*  601*     Recent Labs      10/07/17   1945  10/08/17   0205  10/08/17   0528  10/09/17   0257   TROPONINI  2.85*  2.65*  3.16*   --    BNPBTYPENAT  330*   --    --   601*             Medical Decision Making, by Problem:  Active Hospital Problems    Diagnosis   • *NSTEMI (non-ST elevated myocardial infarction) (CMS-HCC) [I21.4]   • Diabetes mellitus (CMS-[facility]) [E11.9]   • HTN (hypertension) [I10]   • Chronic systolic CHF (congestive heart failure) (CMS-[facility]) [I50.22]   • Paroxysmal atrial fibrillation (CMS-[facility]) [I48.0]   • Cardiomyopathy (CMS-HCC) [I42.9]   • Coagulopathy (CMS-HCC) [D68.9]   • Renal failure [N19]   • AICD (automatic cardioverter/defibrillator) present [Z95.810]   • Dyslipidemia [E78.5]       Assessment / Plan:      NSTEMI ( trop peaked at 3 , trending down ) s/p [facility] 10/8/17  100% LAD, 90 % LCX, , 50% RCA,  70% ALESSANDRO, EF  20%    Viability study today       K=3.5, replace    Plan is to continue with aspirin 81, Plavix 75, losartan 50, Toprol-, Crestor 40    No rhythm issues overnight, normal sinus rhythm    PAF, currently in NSR, Eliquis on hold     CR 1.46 ( no other labs available to compare, [facility] )     Will follow     ( may have to go home on triple therapy  ASA, Plavix, Eliquis, will revaluate on DC )     Spironolactone on hold currently , repeat BMP in am     Reviewed items::  Medications reviewed and Labs reviewed

## 2017-10-09 NOTE — TELEPHONE ENCOUNTER
----- Message from PONCE Downing sent at 10/9/2017 11:14 AM PDT -----      Katelynn Lan [facility]    Last cardiology progress note    Last CMP    And last coronary angiography    Would you let medical records to scan this information   thx

## 2017-10-09 NOTE — CARE PLAN
Problem: Safety  Goal: Will remain free from injury  Outcome: PROGRESSING SLOWER THAN EXPECTED  Pt educated about fall risk and verbalized understanding. Gets up without calling frequently. Needs reorienting. Bed in lowest and locked position, call light and personal possessions within reach. Bed alarm and hourly rounding in place.     Problem: Knowledge Deficit  Goal: Knowledge of disease process/condition, treatment plan, diagnostic tests, and medications will improve  Outcome: PROGRESSING AS EXPECTED  Pt educated about medications with each administration. All questions answered.

## 2017-10-09 NOTE — PROGRESS NOTES
Patient arrived back to floor from cath lab. Vitals stable, on frequents. Right radial site assessed, band loosened per protocol. NS infusing. Will monitor closely.

## 2017-10-09 NOTE — PROGRESS NOTES
Report received at bedside, patient care assumed, tele box on. Pt AAO x 4, no signs of distress noted at this time. POC discussed with pt and all questions answered. No complaint of pain at this time. Pt denies any additional needs at this time. Bed in lowest position, bed alarm on, pt educated on fall risk and verbalized understanding. Call light and personal possessions within reach. Will continue to monitor.

## 2017-10-10 PROBLEM — E83.39 HYPOPHOSPHATEMIA: Status: ACTIVE | Noted: 2017-10-10

## 2017-10-10 LAB
ANION GAP SERPL CALC-SCNC: 12 MMOL/L (ref 0–11.9)
BASOPHILS # BLD AUTO: 0.7 % (ref 0–1.8)
BASOPHILS # BLD: 0.08 K/UL (ref 0–0.12)
BNP SERPL-MCNC: 622 PG/ML (ref 0–100)
BUN SERPL-MCNC: 18 MG/DL (ref 8–22)
CALCIUM SERPL-MCNC: 8.6 MG/DL (ref 8.5–10.5)
CHLORIDE SERPL-SCNC: 102 MMOL/L (ref 96–112)
CO2 SERPL-SCNC: 21 MMOL/L (ref 20–33)
CREAT SERPL-MCNC: 1.36 MG/DL (ref 0.5–1.4)
EOSINOPHIL # BLD AUTO: 0.29 K/UL (ref 0–0.51)
EOSINOPHIL NFR BLD: 2.5 % (ref 0–6.9)
ERYTHROCYTE [DISTWIDTH] IN BLOOD BY AUTOMATED COUNT: 44.4 FL (ref 35.9–50)
EST. AVERAGE GLUCOSE BLD GHB EST-MCNC: 209 MG/DL
GFR SERPL CREATININE-BSD FRML MDRD: 51 ML/MIN/1.73 M 2
GLUCOSE BLD-MCNC: 238 MG/DL (ref 65–99)
GLUCOSE BLD-MCNC: 273 MG/DL (ref 65–99)
GLUCOSE BLD-MCNC: 276 MG/DL (ref 65–99)
GLUCOSE SERPL-MCNC: 200 MG/DL (ref 65–99)
HBA1C MFR BLD: 8.9 % (ref 0–5.6)
HCT VFR BLD AUTO: 41.8 % (ref 42–52)
HGB BLD-MCNC: 14.3 G/DL (ref 14–18)
IMM GRANULOCYTES # BLD AUTO: 0.13 K/UL (ref 0–0.11)
IMM GRANULOCYTES NFR BLD AUTO: 1.1 % (ref 0–0.9)
LYMPHOCYTES # BLD AUTO: 3.18 K/UL (ref 1–4.8)
LYMPHOCYTES NFR BLD: 27.4 % (ref 22–41)
MAGNESIUM SERPL-MCNC: 1.9 MG/DL (ref 1.5–2.5)
MCH RBC QN AUTO: 31.2 PG (ref 27–33)
MCHC RBC AUTO-ENTMCNC: 34.2 G/DL (ref 33.7–35.3)
MCV RBC AUTO: 91.1 FL (ref 81.4–97.8)
MONOCYTES # BLD AUTO: 1.18 K/UL (ref 0–0.85)
MONOCYTES NFR BLD AUTO: 10.2 % (ref 0–13.4)
NEUTROPHILS # BLD AUTO: 6.73 K/UL (ref 1.82–7.42)
NEUTROPHILS NFR BLD: 58.1 % (ref 44–72)
NRBC # BLD AUTO: 0 K/UL
NRBC BLD AUTO-RTO: 0 /100 WBC
PHOSPHATE SERPL-MCNC: 2.3 MG/DL (ref 2.5–4.5)
PLATELET # BLD AUTO: 172 K/UL (ref 164–446)
PMV BLD AUTO: 11.1 FL (ref 9–12.9)
POTASSIUM SERPL-SCNC: 3.8 MMOL/L (ref 3.6–5.5)
RBC # BLD AUTO: 4.59 M/UL (ref 4.7–6.1)
SODIUM SERPL-SCNC: 135 MMOL/L (ref 135–145)
TROPONIN I SERPL-MCNC: 1 NG/ML (ref 0–0.04)
WBC # BLD AUTO: 11.6 K/UL (ref 4.8–10.8)

## 2017-10-10 PROCEDURE — A9270 NON-COVERED ITEM OR SERVICE: HCPCS | Performed by: INTERNAL MEDICINE

## 2017-10-10 PROCEDURE — 82962 GLUCOSE BLOOD TEST: CPT | Mod: 91

## 2017-10-10 PROCEDURE — 84100 ASSAY OF PHOSPHORUS: CPT

## 2017-10-10 PROCEDURE — A9270 NON-COVERED ITEM OR SERVICE: HCPCS | Performed by: HOSPITALIST

## 2017-10-10 PROCEDURE — 700102 HCHG RX REV CODE 250 W/ 637 OVERRIDE(OP): Performed by: INTERNAL MEDICINE

## 2017-10-10 PROCEDURE — A9270 NON-COVERED ITEM OR SERVICE: HCPCS

## 2017-10-10 PROCEDURE — 84484 ASSAY OF TROPONIN QUANT: CPT

## 2017-10-10 PROCEDURE — 36415 COLL VENOUS BLD VENIPUNCTURE: CPT

## 2017-10-10 PROCEDURE — 83036 HEMOGLOBIN GLYCOSYLATED A1C: CPT

## 2017-10-10 PROCEDURE — 700102 HCHG RX REV CODE 250 W/ 637 OVERRIDE(OP)

## 2017-10-10 PROCEDURE — 83880 ASSAY OF NATRIURETIC PEPTIDE: CPT

## 2017-10-10 PROCEDURE — 99232 SBSQ HOSP IP/OBS MODERATE 35: CPT | Performed by: HOSPITALIST

## 2017-10-10 PROCEDURE — 80048 BASIC METABOLIC PNL TOTAL CA: CPT

## 2017-10-10 PROCEDURE — 85025 COMPLETE CBC W/AUTO DIFF WBC: CPT

## 2017-10-10 PROCEDURE — 770020 HCHG ROOM/CARE - TELE (206)

## 2017-10-10 PROCEDURE — A9270 NON-COVERED ITEM OR SERVICE: HCPCS | Performed by: FAMILY MEDICINE

## 2017-10-10 PROCEDURE — 700102 HCHG RX REV CODE 250 W/ 637 OVERRIDE(OP): Performed by: HOSPITALIST

## 2017-10-10 PROCEDURE — 83735 ASSAY OF MAGNESIUM: CPT

## 2017-10-10 PROCEDURE — 87040 BLOOD CULTURE FOR BACTERIA: CPT

## 2017-10-10 PROCEDURE — 700102 HCHG RX REV CODE 250 W/ 637 OVERRIDE(OP): Performed by: FAMILY MEDICINE

## 2017-10-10 RX ORDER — LOSARTAN POTASSIUM 25 MG/1
75 TABLET ORAL DAILY
Status: DISCONTINUED | OUTPATIENT
Start: 2017-10-11 | End: 2017-10-14 | Stop reason: HOSPADM

## 2017-10-10 RX ORDER — LOSARTAN POTASSIUM 25 MG/1
25 TABLET ORAL ONCE
Status: COMPLETED | OUTPATIENT
Start: 2017-10-10 | End: 2017-10-10

## 2017-10-10 RX ADMIN — METOPROLOL SUCCINATE 100 MG: 50 TABLET, EXTENDED RELEASE ORAL at 08:54

## 2017-10-10 RX ADMIN — GABAPENTIN 600 MG: 300 CAPSULE ORAL at 08:54

## 2017-10-10 RX ADMIN — INSULIN LISPRO 5 UNITS: 100 INJECTION, SOLUTION INTRAVENOUS; SUBCUTANEOUS at 13:20

## 2017-10-10 RX ADMIN — ISOSORBIDE MONONITRATE 60 MG: 30 TABLET, EXTENDED RELEASE ORAL at 08:54

## 2017-10-10 RX ADMIN — FAMOTIDINE 20 MG: 20 TABLET, FILM COATED ORAL at 08:54

## 2017-10-10 RX ADMIN — DIBASIC SODIUM PHOSPHATE, MONOBASIC POTASSIUM PHOSPHATE AND MONOBASIC SODIUM PHOSPHATE 2 TABLET: 852; 155; 130 TABLET ORAL at 20:36

## 2017-10-10 RX ADMIN — INSULIN LISPRO 5 UNITS: 100 INJECTION, SOLUTION INTRAVENOUS; SUBCUTANEOUS at 20:47

## 2017-10-10 RX ADMIN — ROSUVASTATIN CALCIUM 40 MG: 20 TABLET, FILM COATED ORAL at 20:36

## 2017-10-10 RX ADMIN — ASPIRIN 81 MG: 81 TABLET, COATED ORAL at 08:54

## 2017-10-10 RX ADMIN — POTASSIUM CHLORIDE 20 MEQ: 1500 TABLET, EXTENDED RELEASE ORAL at 09:00

## 2017-10-10 RX ADMIN — LOSARTAN POTASSIUM 25 MG: 25 TABLET, FILM COATED ORAL at 15:03

## 2017-10-10 RX ADMIN — LOSARTAN POTASSIUM 50 MG: 50 TABLET, FILM COATED ORAL at 08:54

## 2017-10-10 RX ADMIN — FAMOTIDINE 20 MG: 20 TABLET, FILM COATED ORAL at 20:35

## 2017-10-10 RX ADMIN — DIBASIC SODIUM PHOSPHATE, MONOBASIC POTASSIUM PHOSPHATE AND MONOBASIC SODIUM PHOSPHATE 2 TABLET: 852; 155; 130 TABLET ORAL at 08:54

## 2017-10-10 RX ADMIN — FUROSEMIDE 20 MG: 20 TABLET ORAL at 08:54

## 2017-10-10 RX ADMIN — GABAPENTIN 600 MG: 300 CAPSULE ORAL at 20:35

## 2017-10-10 RX ADMIN — INSULIN LISPRO 3 UNITS: 100 INJECTION, SOLUTION INTRAVENOUS; SUBCUTANEOUS at 18:43

## 2017-10-10 RX ADMIN — CLOPIDOGREL 75 MG: 75 TABLET, FILM COATED ORAL at 08:54

## 2017-10-10 ASSESSMENT — ENCOUNTER SYMPTOMS
FEVER: 0
BACK PAIN: 0
HEMOPTYSIS: 0
SPEECH CHANGE: 0
WEAKNESS: 0
ABDOMINAL PAIN: 0
COUGH: 0
SENSORY CHANGE: 0
CHILLS: 0
HEADACHES: 0
WHEEZING: 0
CLAUDICATION: 0
STRIDOR: 0
SPUTUM PRODUCTION: 0
FOCAL WEAKNESS: 0
ORTHOPNEA: 0
PND: 0
FLANK PAIN: 0
PALPITATIONS: 0
NAUSEA: 0
SHORTNESS OF BREATH: 0
EYE DISCHARGE: 0
HALLUCINATIONS: 0
EYE REDNESS: 0
VOMITING: 0
DIZZINESS: 0
DIAPHORESIS: 0

## 2017-10-10 ASSESSMENT — PAIN SCALES - GENERAL
PAINLEVEL_OUTOF10: 0

## 2017-10-10 ASSESSMENT — LIFESTYLE VARIABLES: SUBSTANCE_ABUSE: 0

## 2017-10-10 NOTE — PROGRESS NOTES
Report received at bedside, assumed care. Pt is sitting on edge of bed. A&O x 2, disoriented to time and event. Fatigue present. Denies pain. Right radial cath site CDI, soft and no hematoma present. No other concerns, complains or distress. Tele box on. Chart reviewed. Bed in lowest position, bed alarm on, treaded slipper sock on, and call light within reach.
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
## 2017-10-10 NOTE — PROGRESS NOTES
Bedside report completed. Assumed patient care. Pt lying in bed comfortably. Increased confusion last night and this morning. AOx2 oriented to self and time. Refused morning labs. Reoriented patient. Discussed POC of stress test today. Bed locked in lowest position, call light in reach, bed alarm on.

## 2017-10-10 NOTE — DISCHARGE PLANNING
Upon utilization review, patient noted to be on the following medications that could potentially require prior authorization if prescribed at discharge: Plavix.  If it is anticipated that patient will require these medications at discharge, beginning the prescription prior auth process in advance to anticipated discharge could assist in preventing delays when patient is medically cleared to be discharged from the hospital.

## 2017-10-10 NOTE — CARE PLAN
Problem: Infection  Goal: Will remain free from infection  Outcome: PROGRESSING AS EXPECTED  Patient educated on importance of hand hygiene. Pt expressed understanding that this is the most effective way to prevent the spread of infection.     Problem: Discharge Barriers/Planning  Goal: Patient's continuum of care needs will be met  Outcome: PROGRESSING AS EXPECTED      Problem: Mobility  Goal: Risk for activity intolerance will decrease  Outcome: PROGRESSING AS EXPECTED  Patient up for all meals and ambulated to and from the bathroom three times.

## 2017-10-10 NOTE — CARE PLAN
Problem: Safety  Goal: Will remain free from falls    Intervention: Assess risk factors for falls  Educated patient on use of call light, no slip socks on, bed lowest position. All needs attended to. Bed alarm in use. Patient verbalized understanding but needs reinforcement.         Problem: Psychosocial Needs:  Goal: Level of anxiety will decrease    Intervention: Identify and develop with patient strategies to cope with anxiety triggers  Listened to patients discussion of concerns related to disease process and treatment plan. Discussed with patient concerns, goals and management. Discussed importance of patient reporting new signs and symptoms related to disease process. Discussed with patient anxiety levels and how to appropriately cope with anxiety and discuss needs. Patient verbalized understanding but needs constant reinforcement.

## 2017-10-10 NOTE — PROGRESS NOTES
[facility] Hospitalist Progress Note    Date of Service: 10/10/2017    Chief Complaint    72 y.o. male history of ischemic cardiomyopathy, ejection fraction of 20-25% and chronic atrial fibrillation admitted 10/7/2017 with dyspnea and light headedness.     Interval Problem Update    No new chest pain. Cath w multi vessel dz, systolic dysfunction - plan myocardial viability study .     Consultants/Specialty  Cardio - L[place]ette    Disposition  TBD        Review of Systems   Constitutional: Negative for chills, diaphoresis and fever.   HENT: Negative for congestion.    Eyes: Negative for discharge and redness.   Respiratory: Negative for cough, shortness of breath and stridor.    Cardiovascular: Negative for chest pain and palpitations.   Gastrointestinal: Negative for abdominal pain, nausea and vomiting.   Genitourinary: Negative for flank pain and hematuria.   Musculoskeletal: Negative for back pain and joint pain.   Neurological: Negative for sensory change, speech change, focal weakness, weakness and headaches.   Psychiatric/Behavioral: Negative for hallucinations and substance abuse.      Physical Exam  Laboratory/Imaging   Hemodynamics  Temp (24hrs), Av.6 °C (97.9 °F), Min:36.2 °C (97.2 °F), Max:37.1 °C (98.8 °F)   Temperature: 36.3 °C (97.3 °F)  Pulse  Av.3  Min: 75  Max: 103    Blood Pressure : 130/79      Respiratory      Respiration: 18, Pulse Oximetry: 95 %        RUL Breath Sounds: Clear, RML Breath Sounds: Clear, RLL Breath Sounds: Clear, CORBY Breath Sounds: Clear, LLL Breath Sounds: Clear    Fluids    Intake/Output Summary (Last 24 hours) at 10/10/17 0650  Last data filed at 10/09/17 1600   Gross per 24 hour   Intake              360 ml   Output              750 ml   Net             -390 ml       Nutrition  Orders Placed This Encounter   Procedures   • DIET NPO     Standing Status:   Standing     Number of Occurrences:   8     Order Specific Question:   Restrict to:     Answer:   Sips with Medications [3]      Physical Exam   Constitutional: He is oriented to person, place, and time. He appears well-developed and well-nourished. No distress.   HENT:   Head: Normocephalic and atraumatic.   Eyes: Conjunctivae are normal. Pupils are equal, round, and reactive to light.   Neck: No JVD present.   Cardiovascular: Normal rate and regular rhythm.    No murmur heard.  Pulmonary/Chest: No stridor. He has no wheezes. He has no rales.   Abdominal: Soft. Bowel sounds are normal. He exhibits no distension. There is no tenderness.   Obese    Musculoskeletal: He exhibits no edema.   Neurological: He is alert and oriented to person, place, and time. No cranial nerve deficit.   Skin: Skin is warm and dry.   Psychiatric: He has a normal mood and affect. His behavior is normal.   Nursing note and vitals reviewed.      Recent Labs      10/08/17   1129  10/09/17   0257  10/10/17   0249   WBC  13.3*  14.3*  11.6*   RBC  4.61*  4.73  4.59*   HEMOGLOBIN  14.3  14.7  14.3   HEMATOCRIT  41.6*  43.3  41.8*   MCV  90.2  91.5  91.1   MCH  31.0  31.1  31.2   MCHC  34.4  33.9  34.2   RDW  43.6  44.9  44.4   PLATELETCT  164  188  172   MPV  11.3  11.6  11.1     Recent Labs      10/08/17   1129  10/09/17   0257  10/10/17   0249   SODIUM  135  135  135   POTASSIUM  3.9  3.5*  3.8   CHLORIDE  105  103  102   CO2  21  20  21   GLUCOSE  194*  181*  200*   BUN  23*  21  18   CREATININE  1.35  1.46*  1.36   CALCIUM  8.3*  8.9  8.6     Recent Labs      10/07/17   1945  10/08/17   0528  10/08/17   1130  10/09/17   0257   APTT  39.9*  82.7*  83.4*  34.5   INR  1.55*   --   1.51*   --      Recent Labs      10/07/17   1945  10/09/17   0257  10/10/17   0249   BNPBTYPENAT  330*  601*  622*              Assessment/Plan     * NSTEMI (non-ST elevated myocardial infarction) (CMS-HCC)   Assessment & Plan    Severe multi vessel  CAD. Previously not surgical candidate for bypass.   ASA, Crestor, metoprolol.   Cardiology input- to plan myocardial viability scan.          Hypophosphatemia   Assessment & Plan    Start  neutri phos        CKD (chronic kidney disease) stage 3, GFR 30-59 ml/min- (present on admission)   Assessment & Plan    Follow bmp        Hypokalemia   Assessment & Plan    Kdur            Coagulopathy (CMS-HCC)- (present on admission)   Assessment & Plan    No symptoms of active bleed. , Monitoring coags not reliable on Eliquis         Cardiomyopathy (CMS-HCC)- (present on admission)   Assessment & Plan    EF 20 %  Metoprolol, Cozaar, Lasix, Aldactone        Paroxysmal atrial fibrillation (CMS-HCC)- (present on admission)   Assessment & Plan    Controlled  Coreg, holding Eliquis- plan resume per cards recs.        Chronic systolic CHF (congestive heart failure) (CMS-HCC)- (present on admission)   Assessment & Plan    w exah  Continue Metoprolol, Cozaar  Lasix, Aldactone diuresis.        HTN (hypertension)- (present on admission)   Assessment & Plan    Metoprolol, Cozaar  Monitor.         Diabetes mellitus (CMS-HCC)- (present on admission)   Assessment & Plan    Uncontrolled  Holding metformin post cath, renal dysfunction - until improves.  Check hgba1c  SSI, lantus        AICD (automatic cardioverter/defibrillator) present- (present on admission)   Assessment & Plan    stable        Dyslipidemia- (present on admission)   Assessment & Plan    Crestor            Reviewed items::  EKG reviewed, Radiology images reviewed, Labs reviewed and Medications reviewed  Garcia catheter::  No Garcia  DVT prophylaxis - mechanical:  SCDs  Ulcer Prophylaxis::  No      Discussed with wife plan of care.

## 2017-10-10 NOTE — PROGRESS NOTES
Cardiology Progress Note               Author: Goldy Parra Date & Time created: 10/10/2017  1:58 PM     72 years old male [facility] with [facility], lightheadedness, and dizziness. History of coronary artery disease, ischemic cardiomyopathy, EF 20%, chronic atrial fibrillation, receives care at the [facility], hypertension, diabetes, CVA, AICD implantation (5 years ago)     Consultation for non-STEMI (troponin peaked at 3 )    10/10/17: more oriented now, disoriented overnight and refused some of his medications  /82  HR 84  SR with BBB and rare PVC    Labs reviewed   CR = 1.36  Troponin peaked at 3    Coronary angiography 10/8/17 reveal 100% mid LAD, 90% proximal circumflex, distal circumflex occluded, LAD and PTCA of circumflex fill via collateral, 70% proximal ALESSANDRO, EF 23%    ECHO  10/7/17  Normal left ventricular chamber size.  Left ventricular ejection fraction is visually estimated to be 20%.  Severely reduced left ventricular systolic function.  Global hypokinesis. Pacer/ICD wire seen in right ventricle.  Mildly dilated left atrium.  Aortic sclerosis without stenosis. Trace aortic insufficiency.  Mitral annular calcification.  Mild mitral regurgitation. Moderate tricuspid regurgitation.  Estimated right ventricular systolic pressure  is 65 mmHg.  Normal pericardium without effusion.    Review of Systems   Constitutional: Negative for malaise/fatigue.   Respiratory: Negative for cough, hemoptysis, sputum production, shortness of breath and wheezing.    Cardiovascular: Negative for chest pain, palpitations, orthopnea, claudication, leg swelling and PND.   Neurological: Negative for dizziness and weakness.       Physical Exam   Constitutional: He is oriented to person, place, and time. No distress.   HENT:   Head: Normocephalic.   Neck: No JVD present. No thyromegaly present.   Cardiovascular: Normal rate and regular rhythm.    Pulses:       Carotid pulses are 2+ on the right side, and 2+ on the left  side.       Radial pulses are 2+ on the right side, and 2+ on the left side.   Pulmonary/Chest: He has no wheezes.   Abdominal: Soft.   Musculoskeletal: He exhibits no edema.   Neurological: He is alert and oriented to person, place, and time.   Skin: Skin is warm and dry. He is not diaphoretic.       Hemodynamics:  Temp (24hrs), Av.4 °C (97.6 °F), Min:36.1 °C (96.9 °F), Max:36.9 °C (98.4 °F)  Temperature: 36.1 °C (96.9 °F)  Pulse  Av.4  Min: 75  Max: 103   Blood Pressure : 153/82     Respiratory:    Respiration: 16, Pulse Oximetry: 95 %        RUL Breath Sounds: Clear, RML Breath Sounds: Clear, RLL Breath Sounds: Clear, CORBY Breath Sounds: Clear, LLL Breath Sounds: Clear  Fluids:     Weight: 105.7 kg (233 lb 0.4 oz)  GI/Nutrition:  Orders Placed This Encounter   Procedures   • DIET ORDER     Standing Status:   Standing     Number of Occurrences:   1     Order Specific Question:   Diet:     Answer:   Diabetic [3]     Order Specific Question:   Diet:     Answer:   Cardiac [6]     Lab Results:  Recent Labs      10/08/17   1129  10/09/17   0257  10/10/17   0249   WBC  13.3*  14.3*  11.6*   RBC  4.61*  4.73  4.59*   HEMOGLOBIN  14.3  14.7  14.3   HEMATOCRIT  41.6*  43.3  41.8*   MCV  90.2  91.5  91.1   MCH  31.0  31.1  31.2   MCHC  34.4  33.9  34.2   RDW  43.6  44.9  44.4   PLATELETCT  164  188  172   MPV  11.3  11.6  11.1     Recent Labs      10/08/17   1129  10/09/17   0257  10/10/17   0249   SODIUM  135  135  135   POTASSIUM  3.9  3.5*  3.8   CHLORIDE  105  103  102   CO2  21  20  21   GLUCOSE  194*  181*  200*   BUN  23*  21  18   CREATININE  1.35  1.46*  1.36   CALCIUM  8.3*  8.9  8.6     Recent Labs      10/07/17   1945  10/08/17   0528  10/08/17   1130  10/09/17   025   APTT  39.9*  82.7*  83.4*  34.5   INR  1.55*   --   1.51*   --      Recent Labs      10/07/17   1945  10/09/17   0257  10/10/17   0249   BNPBTYPENAT  330*  601*  622*     Recent Labs      10/07/17   1945  10/08/17   0205  10/08/17   0528   10/09/17   0257  10/10/17   0248  10/10/17   0249   TROPONINI  2.85*  2.65*  3.16*   --   1.00*   --    BNPBTYPENAT  330*   --    --   601*   --   622*             Medical Decision Making, by Problem:  Active Hospital Problems    Diagnosis   • *NSTEMI (non-ST elevated myocardial infarction) (CMS-HCC) [I21.4]   • Diabetes mellitus (CMS-HCC) [E11.9]   • HTN (hypertension) [I10]   • Chronic systolic CHF (congestive heart failure) (CMS-HCC) [I50.22]   • Paroxysmal atrial fibrillation (CMS-HCC) [I48.0]   • Cardiomyopathy (CMS-HCC) [I42.9]   • Coagulopathy (CMS-HCC) [D68.9]   • Renal failure [N19]   • AICD (automatic cardioverter/defibrillator) present [Z95.810]   • Dyslipidemia [E78.5]       Assessment / Plan:    1. NSTEMI:  - trop peaked at 3 , trending down  - s/p [facility] 10/8/17  100% LAD, 90 % LCX, , 50% RCA,  70% ALESSANDRO, EF 20%  - Viability study ordered to see if intervention should even be considered.   - Plan is to continue with aspirin 81, Plavix 75, losartan 75, Toprol-, Crestor 40    No rhythm issues overnight, normal sinus rhythm    2. PAF:  - currently in NSR, Eliquis on hold   - rate controlled with toprol XL 100mg     3. AICD    Will follow         Reviewed items::  Medications reviewed and Labs reviewed

## 2017-10-11 ENCOUNTER — APPOINTMENT (OUTPATIENT)
Dept: RADIOLOGY | Facility: MEDICAL CENTER | Age: 72
DRG: 281 | End: 2017-10-11
Attending: NURSE PRACTITIONER
Payer: COMMERCIAL

## 2017-10-11 PROBLEM — R41.0 DELIRIUM: Status: ACTIVE | Noted: 2017-10-11

## 2017-10-11 LAB
ANION GAP SERPL CALC-SCNC: 10 MMOL/L (ref 0–11.9)
BASOPHILS # BLD AUTO: 0.6 % (ref 0–1.8)
BASOPHILS # BLD: 0.07 K/UL (ref 0–0.12)
BUN SERPL-MCNC: 16 MG/DL (ref 8–22)
CALCIUM SERPL-MCNC: 8.7 MG/DL (ref 8.5–10.5)
CHLORIDE SERPL-SCNC: 102 MMOL/L (ref 96–112)
CO2 SERPL-SCNC: 24 MMOL/L (ref 20–33)
CREAT SERPL-MCNC: 1.27 MG/DL (ref 0.5–1.4)
EOSINOPHIL # BLD AUTO: 0.22 K/UL (ref 0–0.51)
EOSINOPHIL NFR BLD: 1.9 % (ref 0–6.9)
ERYTHROCYTE [DISTWIDTH] IN BLOOD BY AUTOMATED COUNT: 43.8 FL (ref 35.9–50)
GFR SERPL CREATININE-BSD FRML MDRD: 56 ML/MIN/1.73 M 2
GLUCOSE BLD-MCNC: 172 MG/DL (ref 65–99)
GLUCOSE BLD-MCNC: 196 MG/DL (ref 65–99)
GLUCOSE BLD-MCNC: 215 MG/DL (ref 65–99)
GLUCOSE BLD-MCNC: 223 MG/DL (ref 65–99)
GLUCOSE SERPL-MCNC: 199 MG/DL (ref 65–99)
HCT VFR BLD AUTO: 43.2 % (ref 42–52)
HGB BLD-MCNC: 14.6 G/DL (ref 14–18)
IMM GRANULOCYTES # BLD AUTO: 0.11 K/UL (ref 0–0.11)
IMM GRANULOCYTES NFR BLD AUTO: 1 % (ref 0–0.9)
LYMPHOCYTES # BLD AUTO: 3.32 K/UL (ref 1–4.8)
LYMPHOCYTES NFR BLD: 28.9 % (ref 22–41)
MCH RBC QN AUTO: 30.8 PG (ref 27–33)
MCHC RBC AUTO-ENTMCNC: 33.8 G/DL (ref 33.7–35.3)
MCV RBC AUTO: 91.1 FL (ref 81.4–97.8)
MONOCYTES # BLD AUTO: 1.1 K/UL (ref 0–0.85)
MONOCYTES NFR BLD AUTO: 9.6 % (ref 0–13.4)
NEUTROPHILS # BLD AUTO: 6.65 K/UL (ref 1.82–7.42)
NEUTROPHILS NFR BLD: 58 % (ref 44–72)
NRBC # BLD AUTO: 0 K/UL
NRBC BLD AUTO-RTO: 0 /100 WBC
PLATELET # BLD AUTO: 196 K/UL (ref 164–446)
PMV BLD AUTO: 11.8 FL (ref 9–12.9)
POTASSIUM SERPL-SCNC: 3.6 MMOL/L (ref 3.6–5.5)
RBC # BLD AUTO: 4.74 M/UL (ref 4.7–6.1)
SODIUM SERPL-SCNC: 136 MMOL/L (ref 135–145)
WBC # BLD AUTO: 11.5 K/UL (ref 4.8–10.8)

## 2017-10-11 PROCEDURE — 700102 HCHG RX REV CODE 250 W/ 637 OVERRIDE(OP): Performed by: HOSPITALIST

## 2017-10-11 PROCEDURE — 99232 SBSQ HOSP IP/OBS MODERATE 35: CPT | Performed by: HOSPITALIST

## 2017-10-11 PROCEDURE — A9270 NON-COVERED ITEM OR SERVICE: HCPCS | Performed by: HOSPITALIST

## 2017-10-11 PROCEDURE — 700102 HCHG RX REV CODE 250 W/ 637 OVERRIDE(OP): Performed by: INTERNAL MEDICINE

## 2017-10-11 PROCEDURE — G8987 SELF CARE CURRENT STATUS: HCPCS | Mod: CJ

## 2017-10-11 PROCEDURE — 85025 COMPLETE CBC W/AUTO DIFF WBC: CPT

## 2017-10-11 PROCEDURE — 700102 HCHG RX REV CODE 250 W/ 637 OVERRIDE(OP): Performed by: FAMILY MEDICINE

## 2017-10-11 PROCEDURE — G8989 SELF CARE D/C STATUS: HCPCS | Mod: CJ

## 2017-10-11 PROCEDURE — 97165 OT EVAL LOW COMPLEX 30 MIN: CPT

## 2017-10-11 PROCEDURE — A9270 NON-COVERED ITEM OR SERVICE: HCPCS | Performed by: INTERNAL MEDICINE

## 2017-10-11 PROCEDURE — 36415 COLL VENOUS BLD VENIPUNCTURE: CPT

## 2017-10-11 PROCEDURE — G8988 SELF CARE GOAL STATUS: HCPCS | Mod: CJ

## 2017-10-11 PROCEDURE — A9270 NON-COVERED ITEM OR SERVICE: HCPCS

## 2017-10-11 PROCEDURE — 82962 GLUCOSE BLOOD TEST: CPT | Mod: 91

## 2017-10-11 PROCEDURE — 700102 HCHG RX REV CODE 250 W/ 637 OVERRIDE(OP)

## 2017-10-11 PROCEDURE — 80048 BASIC METABOLIC PNL TOTAL CA: CPT

## 2017-10-11 PROCEDURE — 770020 HCHG ROOM/CARE - TELE (206)

## 2017-10-11 RX ORDER — SPIRONOLACTONE 25 MG/1
12.5 TABLET ORAL
Status: DISCONTINUED | OUTPATIENT
Start: 2017-10-11 | End: 2017-10-14 | Stop reason: HOSPADM

## 2017-10-11 RX ORDER — INSULIN GLARGINE 100 [IU]/ML
15 INJECTION, SOLUTION SUBCUTANEOUS
Status: DISCONTINUED | OUTPATIENT
Start: 2017-10-12 | End: 2017-10-13

## 2017-10-11 RX ORDER — SPIRONOLACTONE 25 MG/1
25 TABLET ORAL
Status: DISCONTINUED | OUTPATIENT
Start: 2017-10-11 | End: 2017-10-11

## 2017-10-11 RX ADMIN — INSULIN GLARGINE 10 UNITS: 100 INJECTION, SOLUTION SUBCUTANEOUS at 06:11

## 2017-10-11 RX ADMIN — GABAPENTIN 600 MG: 300 CAPSULE ORAL at 19:59

## 2017-10-11 RX ADMIN — ASPIRIN 81 MG: 81 TABLET, COATED ORAL at 09:40

## 2017-10-11 RX ADMIN — ROSUVASTATIN CALCIUM 40 MG: 20 TABLET, FILM COATED ORAL at 19:58

## 2017-10-11 RX ADMIN — CLOPIDOGREL 75 MG: 75 TABLET, FILM COATED ORAL at 09:40

## 2017-10-11 RX ADMIN — SPIRONOLACTONE 12.5 MG: 25 TABLET, FILM COATED ORAL at 09:42

## 2017-10-11 RX ADMIN — FAMOTIDINE 20 MG: 20 TABLET, FILM COATED ORAL at 09:40

## 2017-10-11 RX ADMIN — INSULIN LISPRO 2 UNITS: 100 INJECTION, SOLUTION INTRAVENOUS; SUBCUTANEOUS at 12:49

## 2017-10-11 RX ADMIN — DIBASIC SODIUM PHOSPHATE, MONOBASIC POTASSIUM PHOSPHATE AND MONOBASIC SODIUM PHOSPHATE 2 TABLET: 852; 155; 130 TABLET ORAL at 19:59

## 2017-10-11 RX ADMIN — INSULIN LISPRO 2 UNITS: 100 INJECTION, SOLUTION INTRAVENOUS; SUBCUTANEOUS at 18:15

## 2017-10-11 RX ADMIN — ISOSORBIDE MONONITRATE 60 MG: 30 TABLET, EXTENDED RELEASE ORAL at 09:40

## 2017-10-11 RX ADMIN — FUROSEMIDE 20 MG: 20 TABLET ORAL at 09:40

## 2017-10-11 RX ADMIN — FAMOTIDINE 20 MG: 20 TABLET, FILM COATED ORAL at 19:58

## 2017-10-11 RX ADMIN — GABAPENTIN 600 MG: 300 CAPSULE ORAL at 09:40

## 2017-10-11 RX ADMIN — METOPROLOL SUCCINATE 100 MG: 50 TABLET, EXTENDED RELEASE ORAL at 09:39

## 2017-10-11 RX ADMIN — INSULIN LISPRO 3 UNITS: 100 INJECTION, SOLUTION INTRAVENOUS; SUBCUTANEOUS at 20:07

## 2017-10-11 RX ADMIN — INSULIN LISPRO 3 UNITS: 100 INJECTION, SOLUTION INTRAVENOUS; SUBCUTANEOUS at 06:12

## 2017-10-11 RX ADMIN — LOSARTAN POTASSIUM 75 MG: 25 TABLET, FILM COATED ORAL at 09:39

## 2017-10-11 RX ADMIN — DIBASIC SODIUM PHOSPHATE, MONOBASIC POTASSIUM PHOSPHATE AND MONOBASIC SODIUM PHOSPHATE 2 TABLET: 852; 155; 130 TABLET ORAL at 09:40

## 2017-10-11 ASSESSMENT — ENCOUNTER SYMPTOMS
WEAKNESS: 0
FOCAL WEAKNESS: 0
BACK PAIN: 0
CLAUDICATION: 0
ABDOMINAL PAIN: 0
HALLUCINATIONS: 1
PALPITATIONS: 0
SENSORY CHANGE: 0
PND: 0
SHORTNESS OF BREATH: 0
HEMOPTYSIS: 0
ORTHOPNEA: 0
DIZZINESS: 0
COUGH: 0
SPEECH CHANGE: 0
NAUSEA: 0
VOMITING: 0
STRIDOR: 0
HEADACHES: 0
CHILLS: 0
SPUTUM PRODUCTION: 0
WHEEZING: 0
FLANK PAIN: 0
FEVER: 0
DIAPHORESIS: 0

## 2017-10-11 ASSESSMENT — COGNITIVE AND FUNCTIONAL STATUS - GENERAL
HELP NEEDED FOR BATHING: A LITTLE
SUGGESTED CMS G CODE MODIFIER DAILY ACTIVITY: CK
DRESSING REGULAR UPPER BODY CLOTHING: A LITTLE
EATING MEALS: A LITTLE
DRESSING REGULAR LOWER BODY CLOTHING: A LITTLE
DAILY ACTIVITIY SCORE: 18
PERSONAL GROOMING: A LITTLE
TOILETING: A LITTLE

## 2017-10-11 ASSESSMENT — ACTIVITIES OF DAILY LIVING (ADL): TOILETING: REQUIRES ASSIST

## 2017-10-11 ASSESSMENT — PAIN SCALES - GENERAL
PAINLEVEL_OUTOF10: 0
PAINLEVEL_OUTOF10: 0

## 2017-10-11 ASSESSMENT — LIFESTYLE VARIABLES: SUBSTANCE_ABUSE: 0

## 2017-10-11 NOTE — PROGRESS NOTES
Patient resting in bed, assessment complete, awake and alert, disorientd to time, denies pain.  Scheduled medications given, needs addressed, call light in reach, bed alarm on, will monitor.

## 2017-10-11 NOTE — ASSESSMENT & PLAN NOTE
Sundowning, no signs acute infxn- improved.   Re assurance /  Orienting   Avoid, benzos, narcotics.

## 2017-10-11 NOTE — PROGRESS NOTES
Patient resting in bed, no distress noted, call light in reach, bed alarm on, will monitor.  Monitor summary: SR 82-92, frequent PVC, rare couplet, loyda, 18/08/40.

## 2017-10-11 NOTE — PROGRESS NOTES
Bedside report completed. Assumed patient care. Pt lying in bed comfortably. Continues to sundown at night. AOx3 this morning.  Discussed POC of stress test today. Bed locked in lowest position, call light in reach, bed alarm on. All patient needs met at this time.

## 2017-10-11 NOTE — PROGRESS NOTES
Patient sleeping, no signs or symptoms of distress noted, no change from previous assessment.  Call light in reach, bed alarm on, will monitor.

## 2017-10-11 NOTE — THERAPY
"Occupational Therapy Evaluation completed.   Functional Status: Supv transfers with FWW, supv EOB > supine, supv standing grooming, mod A LB dressing   Plan of Care: Patient with no further skilled OT needs in the acute care setting at this time  Discharge Recommendations:  Equipment: No Equipment Needed. Post-acute therapy Discharge to home with outpatient or home health for additional skilled therapy services.    See \"Rehab Therapy-Acute\" Patient Summary Report for complete documentation.    72 y.o. male with h/o a-fib, AICD, CAD, DMII, cardiomyopathy, now admitted with chest pain. Dx with NSTEMI. Pt seen for OT eval. Pt visual impairment, uses AD for all mobility at baseline. Wife assist with ADL and I-ADL PTA. Pt appears to be near or at baseline function for basic ADL and transfers. No further acute OT needs at this time.     "

## 2017-10-11 NOTE — CARE PLAN
Problem: Safety  Goal: Will remain free from falls    Intervention: Implement fall precautions  Strip alarm on, non-skid socks in place, patient educated to call for assistance      Problem: Knowledge Deficit  Goal: Knowledge of disease process/condition, treatment plan, diagnostic tests, and medications will improve    Intervention: Explain information regarding disease process/condition, treatment plan, diagnostic tests, and medications and document in education  Patient updated on plan of care, verbalized understanding

## 2017-10-11 NOTE — PROGRESS NOTES
2 RN skin check preformed. Right groin bruising noted at femoral site. Left forearm bruising noted. Right radial site clean dry and intact. Right hip bruising and hematoma noted. Otherwise skin is intact with no areas of concern.

## 2017-10-11 NOTE — PROGRESS NOTES
[facility] Hospitalist Progress Note    Date of Service: 10/11/2017    Chief Complaint    72 y.o. male history of ischemic cardiomyopathy, ejection fraction of 20-25% and chronic atrial fibrillation admitted 10/7/2017 with dyspnea and light headedness.     Interval Problem Update    Shortness of breath has improved. Tele sinus rhythm w frequent PVCs. Periods of confusion-improved this morning.   Plan thallium myocardium viability study.    Consultants/Specialty  Cardio - [place]ette    Disposition  TBD        Review of Systems   Constitutional: Negative for chills, diaphoresis and fever.   HENT: Negative for congestion.    Respiratory: Negative for cough, shortness of breath and stridor.    Cardiovascular: Negative for chest pain, palpitations and leg swelling.   Gastrointestinal: Negative for abdominal pain, nausea and vomiting.   Genitourinary: Negative for flank pain and hematuria.   Musculoskeletal: Negative for back pain and joint pain.   Neurological: Negative for sensory change, speech change, focal weakness, weakness and headaches.   Psychiatric/Behavioral: Positive for hallucinations (episodes, improved.). Negative for substance abuse.      Physical Exam  Laboratory/Imaging   Hemodynamics  Temp (24hrs), Av.3 °C (97.4 °F), Min:36 °C (96.8 °F), Max:37 °C (98.6 °F)   Temperature: 36.4 °C (97.6 °F)  Pulse  Av.8  Min: 75  Max: 103    Blood Pressure : 139/98      Respiratory      Respiration: 19, Pulse Oximetry: 95 %        RUL Breath Sounds: Clear, RML Breath Sounds: Clear, RLL Breath Sounds: Clear, CORBY Breath Sounds: Clear, LLL Breath Sounds: Clear    Fluids    Intake/Output Summary (Last 24 hours) at 10/11/17 0726  Last data filed at 10/11/17 0000   Gross per 24 hour   Intake             1200 ml   Output              275 ml   Net              925 ml       Nutrition  Orders Placed This Encounter   Procedures   • DIET NPO     Standing Status:   Standing     Number of Occurrences:   8     Order Specific Question:    Restrict to:     Answer:   Sips with Medications [3]     Physical Exam   Constitutional: He appears well-developed and well-nourished. No distress.   HENT:   Head: Normocephalic and atraumatic.   Eyes: Conjunctivae and EOM are normal. Right eye exhibits no discharge. Left eye exhibits no discharge. No scleral icterus.   Neck: No JVD present.   Cardiovascular: Normal rate and regular rhythm.    No murmur heard.  Pulmonary/Chest: No stridor. He has no wheezes. He has no rales.   Abdominal: Soft. Bowel sounds are normal. He exhibits no distension. There is no tenderness.   Obese    Musculoskeletal: He exhibits no edema or tenderness.   Neurological: He is alert. No cranial nerve deficit.   o x 2, mild confusion.   Skin: Skin is warm and dry.   Psychiatric:   Calm, cooperative.    Nursing note and vitals reviewed.      Recent Labs      10/09/17   0257  10/10/17   0249  10/11/17   0302   WBC  14.3*  11.6*  11.5*   RBC  4.73  4.59*  4.74   HEMOGLOBIN  14.7  14.3  14.6   HEMATOCRIT  43.3  41.8*  43.2   MCV  91.5  91.1  91.1   MCH  31.1  31.2  30.8   MCHC  33.9  34.2  33.8   RDW  44.9  44.4  43.8   PLATELETCT  188  172  196   MPV  11.6  11.1  11.8     Recent Labs      10/09/17   0257  10/10/17   0249  10/11/17   0302   SODIUM  135  135  136   POTASSIUM  3.5*  3.8  3.6   CHLORIDE  103  102  102   CO2  20  21  24   GLUCOSE  181*  200*  199*   BUN  21  18  16   CREATININE  1.46*  1.36  1.27   CALCIUM  8.9  8.6  8.7     Recent Labs      10/08/17   1130  10/09/17   0257   APTT  83.4*  34.5   INR  1.51*   --      Recent Labs      10/09/17   0257  10/10/17   0249   BNPBTYPENAT  601*  622*              Assessment/Plan     * NSTEMI (non-ST elevated myocardial infarction) (CMS-[facility])   Assessment & Plan    Severe multi vessel  CAD. Previously not surgical candidate for bypass- improved dyspnea, no chest pain.   ASA, Crestor, metoprolol.   imdur  Myocardial viability scan.   Cardiology input        Hypophosphatemia   Assessment & Plan     continue neutri phos  Fu level.        CKD (chronic kidney disease) stage 3, GFR 30-59 ml/min- (present on admission)   Assessment & Plan    Follow bmp        Hypokalemia   Assessment & Plan    Correcting  Kdur            Coagulopathy (CMS-HCC)- (present on admission)   Assessment & Plan    No symptoms of active bleed. , Monitoring coags not reliable on Eliquis         Cardiomyopathy (CMS-HCC)- (present on admission)   Assessment & Plan    EF 20 %  Metoprolol, Cozaar, Lasix, Aldactone        Paroxysmal atrial fibrillation (CMS-HCC)- (present on admission)   Assessment & Plan    Controlled- in sinus rhythm.   Coreg  Tele monitoring.   Holding Eliquis- plan resume per cards recs.          Chronic systolic CHF (congestive heart failure) (CMS-HCC)- (present on admission)   Assessment & Plan    w exah  Continue Metoprolol, Cozaar  Lasix, Aldactone diuresis.        HTN (hypertension)- (present on admission)   Assessment & Plan    Metoprolol, Cozaar  Monitor.         Diabetes mellitus (CMS-HCC)- (present on admission)   Assessment & Plan    Uncontrolled  SSI, lantus-- increase dose.  Holding metformin post cath, renal dysfunction - until improves.          Delirium   Assessment & Plan    Sundowning, no signs acute infxn  Re assurance   Avoid, benzos, narcotics.        AICD (automatic cardioverter/defibrillator) present- (present on admission)   Assessment & Plan    stable        Dyslipidemia- (present on admission)   Assessment & Plan    Crestor            Reviewed items::  EKG reviewed, Radiology images reviewed, Labs reviewed and Medications reviewed  Garcia catheter::  No Garcia  DVT prophylaxis - mechanical:  SCDs  Ulcer Prophylaxis::  No

## 2017-10-11 NOTE — PROGRESS NOTES
Cardiovascular Nurse Navigator ([id number]) Note:    Reviewed ACS/PCI medications:  Dual Antiplatelet Therapy (DAPT):  aspirin + clopidogrel  Beta-Blocker:  toprol XL   Statin:  rosuvastatin  ACEI/ARB:  losartan  Aldosterone blocking agent:   aldactone    Intensive Cardiac Rehab (ICR) Referral:  Referred on 10/8; has current inpatient orders for nutrition consult & PT for Phase I ICR    Cardiology Follow-Up:  [facility] at 0930--added to AVS      Inpatient & Discharge Patient Education:  Bedside nursing to continually provide patient education on ACS meds, signs and symptoms to monitor for, and risk factor modification. Also at discharge please complete the “ACS” special instructions on the AVS.  Thank you and please call with questions.

## 2017-10-11 NOTE — PROGRESS NOTES
Cardiology Progress Note               Author: Goldy Parra Date & Time created: 10/11/2017  10:33 AM     72 [facility], and dizziness. History of coronary artery disease, ischemic cardiomyopathy, EF 20%, chronic atrial fibrillation, receives care at the [facility], hypertension, diabetes, CVA, AICD implantation (5 years ago)     Consultation for non-STEMI (troponin peaked at 3 )    10/11/17: denies any chest pain or HARMON, pthal today   /90  HR 82-92 SR     Labs reviewed   CR = 1.27  Troponin peaked at 3    Coronary angiography 10/8/17 reveal 100% mid LAD, 90% proximal circumflex, distal circumflex occluded, LAD and PTCA of circumflex fill via collateral, 70% proximal ALESSANDRO, EF 23%    ECHO  10/7/17  Normal left ventricular chamber size.  Left ventricular ejection fraction is visually estimated to be 20%.  Severely reduced left ventricular systolic function.  Global hypokinesis. Pacer/ICD wire seen in right ventricle.  Mildly dilated left atrium.  Aortic sclerosis without stenosis. Trace aortic insufficiency.  Mitral annular calcification.  Mild mitral regurgitation. Moderate tricuspid regurgitation.  Estimated right ventricular systolic pressure  is 65 mmHg.  Normal pericardium without effusion.    Review of Systems   Constitutional: Negative for malaise/fatigue.   Respiratory: Negative for cough, hemoptysis, sputum production, shortness of breath and wheezing.    Cardiovascular: Negative for chest pain, palpitations, orthopnea, claudication, leg swelling and PND.   Neurological: Negative for dizziness and weakness.       Physical Exam   Constitutional: He is oriented to person, place, and time. No distress.   HENT:   Head: Normocephalic.   Neck: No JVD present. No thyromegaly present.   Cardiovascular: Normal rate and regular rhythm.    Pulses:       Carotid pulses are 2+ on the right side, and 2+ on the left side.       Radial pulses are 2+ on the right side,  and 2+ on the left side.   Pulmonary/Chest: He has no wheezes.   Abdominal: Soft.   Musculoskeletal: He exhibits no edema.   Neurological: He is alert and oriented to person, place, and time.   Skin: Skin is warm and dry. He is not diaphoretic.       Hemodynamics:  Temp (24hrs), Av.4 °C (97.5 °F), Min:36 °C (96.8 °F), Max:37 °C (98.6 °F)  Temperature: 36.6 °C (97.8 °F)  Pulse  Av.6  Min: 75  Max: 103   Blood Pressure : (!) 165/90 (RN notified)     Respiratory:    Respiration: 17, Pulse Oximetry: 94 %        RUL Breath Sounds: Clear, RML Breath Sounds: Clear, RLL Breath Sounds: Clear, CORBY Breath Sounds: Clear, LLL Breath Sounds: Clear  Fluids:     Weight: 106.5 kg (234 lb 12.6 oz)  GI/Nutrition:  Orders Placed This Encounter   Procedures   • DIET NPO     Standing Status:   Standing     Number of Occurrences:   8     Order Specific Question:   Restrict to:     Answer:   Sips with Medications [3]     Lab Results:  Recent Labs      10/09/17   0257  10/10/17   0249  10/11/17   0302   WBC  14.3*  11.6*  11.5*   RBC  4.73  4.59*  4.74   HEMOGLOBIN  14.7  14.3  14.6   HEMATOCRIT  43.3  41.8*  43.2   MCV  91.5  91.1  91.1   MCH  31.1  31.2  30.8   MCHC  33.9  34.2  33.8   RDW  44.9  44.4  43.8   PLATELETCT  188  172  196   MPV  11.6  11.1  11.8     Recent Labs      10/09/17   0257  10/10/17   0249  10/11/17   0302   SODIUM  135  135  136   POTASSIUM  3.5*  3.8  3.6   CHLORIDE  103  102  102   CO2  20  21  24   GLUCOSE  181*  200*  199*   BUN  21  18  16   CREATININE  1.46*  1.36  1.27   CALCIUM  8.9  8.6  8.7     Recent Labs      10/08/17   1130  10/09/17   0257   APTT  83.4*  34.5   INR  1.51*   --      Recent Labs      10/09/17   0257  10/10/17   0249   BNPBTYPENAT  601*  622*     Recent Labs      10/09/17   0257  10/10/17   0248  10/10/17   0249   TROPONINI   --   1.00*   --    BNPBTYPENAT  601*   --   622*             Medical Decision Making, by Problem:  Active Hospital Problems    Diagnosis   • *NSTEMI (non-ST  elevated myocardial infarction) (CMS-[facility]) [I21.4]   • Diabetes mellitus (CMS-[facility]) [E11.9]   • HTN (hypertension) [I10]   • Chronic systolic CHF (congestive heart failure) (CMS-[facility]) [I50.22]   • Paroxysmal atrial fibrillation (CMS-[facility]) [I48.0]   • Cardiomyopathy (CMS-HCC) [I42.9]   • Coagulopathy (CMS-[facility]) [D68.9]   • Renal failure [N19]   • AICD (automatic cardioverter/defibrillator) present [Z95.810]   • Dyslipidemia [E78.5]       Assessment / Plan:    1. NSTEMI:  - trop peaked at 3 , trending down  - s/p C 10/8/17: 100% LAD, 90 % LCX, 50% RCA,  70% ALESSANDRO, EF 20%  - Viability study could not be done due to cardiac device. Awaiting for NM thallium study  - Plan is to continue with aspirin 81, Plavix 75, losartan 75, Toprol-, Crestor 40, and aldactone 12.5mg qd  - No rhythm issues overnight, normal sinus rhythm    2. PAF:  - currently in NSR, Eliquis on hold   - rate controlled with toprol XL 100mg     3. AICD:    Will follow         Reviewed items::  Medications reviewed and Labs reviewed

## 2017-10-12 LAB
ANION GAP SERPL CALC-SCNC: 11 MMOL/L (ref 0–11.9)
BASOPHILS # BLD AUTO: 1 % (ref 0–1.8)
BASOPHILS # BLD: 0.12 K/UL (ref 0–0.12)
BUN SERPL-MCNC: 20 MG/DL (ref 8–22)
CALCIUM SERPL-MCNC: 8.5 MG/DL (ref 8.5–10.5)
CHLORIDE SERPL-SCNC: 103 MMOL/L (ref 96–112)
CO2 SERPL-SCNC: 23 MMOL/L (ref 20–33)
CREAT SERPL-MCNC: 1.47 MG/DL (ref 0.5–1.4)
EOSINOPHIL # BLD AUTO: 0.45 K/UL (ref 0–0.51)
EOSINOPHIL NFR BLD: 3.7 % (ref 0–6.9)
ERYTHROCYTE [DISTWIDTH] IN BLOOD BY AUTOMATED COUNT: 43.8 FL (ref 35.9–50)
GFR SERPL CREATININE-BSD FRML MDRD: 47 ML/MIN/1.73 M 2
GLUCOSE BLD-MCNC: 192 MG/DL (ref 65–99)
GLUCOSE BLD-MCNC: 202 MG/DL (ref 65–99)
GLUCOSE BLD-MCNC: 278 MG/DL (ref 65–99)
GLUCOSE BLD-MCNC: 312 MG/DL (ref 65–99)
GLUCOSE SERPL-MCNC: 192 MG/DL (ref 65–99)
HCT VFR BLD AUTO: 42.5 % (ref 42–52)
HGB BLD-MCNC: 14.4 G/DL (ref 14–18)
IMM GRANULOCYTES # BLD AUTO: 0.11 K/UL (ref 0–0.11)
IMM GRANULOCYTES NFR BLD AUTO: 0.9 % (ref 0–0.9)
LV EJECT FRACT  99904: 22
LYMPHOCYTES # BLD AUTO: 3.96 K/UL (ref 1–4.8)
LYMPHOCYTES NFR BLD: 32.9 % (ref 22–41)
MCH RBC QN AUTO: 30.6 PG (ref 27–33)
MCHC RBC AUTO-ENTMCNC: 33.9 G/DL (ref 33.7–35.3)
MCV RBC AUTO: 90.2 FL (ref 81.4–97.8)
MONOCYTES # BLD AUTO: 1.21 K/UL (ref 0–0.85)
MONOCYTES NFR BLD AUTO: 10.1 % (ref 0–13.4)
NEUTROPHILS # BLD AUTO: 6.18 K/UL (ref 1.82–7.42)
NEUTROPHILS NFR BLD: 51.4 % (ref 44–72)
NRBC # BLD AUTO: 0 K/UL
NRBC BLD AUTO-RTO: 0 /100 WBC
PHOSPHATE SERPL-MCNC: 3.9 MG/DL (ref 2.5–4.5)
PLATELET # BLD AUTO: 196 K/UL (ref 164–446)
PMV BLD AUTO: 11.5 FL (ref 9–12.9)
POTASSIUM SERPL-SCNC: 3.4 MMOL/L (ref 3.6–5.5)
RBC # BLD AUTO: 4.71 M/UL (ref 4.7–6.1)
SODIUM SERPL-SCNC: 137 MMOL/L (ref 135–145)
WBC # BLD AUTO: 12 K/UL (ref 4.8–10.8)

## 2017-10-12 PROCEDURE — A9270 NON-COVERED ITEM OR SERVICE: HCPCS | Performed by: INTERNAL MEDICINE

## 2017-10-12 PROCEDURE — 700102 HCHG RX REV CODE 250 W/ 637 OVERRIDE(OP): Performed by: HOSPITALIST

## 2017-10-12 PROCEDURE — 700102 HCHG RX REV CODE 250 W/ 637 OVERRIDE(OP): Performed by: INTERNAL MEDICINE

## 2017-10-12 PROCEDURE — 82962 GLUCOSE BLOOD TEST: CPT | Mod: 91

## 2017-10-12 PROCEDURE — A9270 NON-COVERED ITEM OR SERVICE: HCPCS | Performed by: HOSPITALIST

## 2017-10-12 PROCEDURE — 85025 COMPLETE CBC W/AUTO DIFF WBC: CPT

## 2017-10-12 PROCEDURE — 770020 HCHG ROOM/CARE - TELE (206)

## 2017-10-12 PROCEDURE — 36415 COLL VENOUS BLD VENIPUNCTURE: CPT

## 2017-10-12 PROCEDURE — 80048 BASIC METABOLIC PNL TOTAL CA: CPT

## 2017-10-12 PROCEDURE — 99232 SBSQ HOSP IP/OBS MODERATE 35: CPT | Performed by: HOSPITALIST

## 2017-10-12 PROCEDURE — 84100 ASSAY OF PHOSPHORUS: CPT

## 2017-10-12 PROCEDURE — A9270 NON-COVERED ITEM OR SERVICE: HCPCS

## 2017-10-12 PROCEDURE — A6250 SKIN SEAL PROTECT MOISTURIZR: HCPCS | Performed by: HOSPITALIST

## 2017-10-12 PROCEDURE — 700102 HCHG RX REV CODE 250 W/ 637 OVERRIDE(OP)

## 2017-10-12 RX ORDER — NYSTATIN 100000 [USP'U]/G
POWDER TOPICAL 2 TIMES DAILY
Status: DISCONTINUED | OUTPATIENT
Start: 2017-10-12 | End: 2017-10-13

## 2017-10-12 RX ORDER — POTASSIUM CHLORIDE 20 MEQ/1
20 TABLET, EXTENDED RELEASE ORAL ONCE
Status: COMPLETED | OUTPATIENT
Start: 2017-10-12 | End: 2017-10-12

## 2017-10-12 RX ADMIN — INSULIN LISPRO 5 UNITS: 100 INJECTION, SOLUTION INTRAVENOUS; SUBCUTANEOUS at 17:44

## 2017-10-12 RX ADMIN — ASPIRIN 81 MG: 81 TABLET, COATED ORAL at 08:39

## 2017-10-12 RX ADMIN — INSULIN LISPRO 6 UNITS: 100 INJECTION, SOLUTION INTRAVENOUS; SUBCUTANEOUS at 20:13

## 2017-10-12 RX ADMIN — GABAPENTIN 600 MG: 300 CAPSULE ORAL at 20:03

## 2017-10-12 RX ADMIN — METOPROLOL SUCCINATE 100 MG: 50 TABLET, EXTENDED RELEASE ORAL at 08:37

## 2017-10-12 RX ADMIN — ISOSORBIDE MONONITRATE 60 MG: 30 TABLET, EXTENDED RELEASE ORAL at 08:39

## 2017-10-12 RX ADMIN — INSULIN LISPRO 2 UNITS: 100 INJECTION, SOLUTION INTRAVENOUS; SUBCUTANEOUS at 06:34

## 2017-10-12 RX ADMIN — SPIRONOLACTONE 12.5 MG: 25 TABLET, FILM COATED ORAL at 08:39

## 2017-10-12 RX ADMIN — GABAPENTIN 600 MG: 300 CAPSULE ORAL at 08:39

## 2017-10-12 RX ADMIN — FAMOTIDINE 20 MG: 20 TABLET, FILM COATED ORAL at 20:03

## 2017-10-12 RX ADMIN — INSULIN LISPRO 3 UNITS: 100 INJECTION, SOLUTION INTRAVENOUS; SUBCUTANEOUS at 11:53

## 2017-10-12 RX ADMIN — ROSUVASTATIN CALCIUM 40 MG: 20 TABLET, FILM COATED ORAL at 20:04

## 2017-10-12 RX ADMIN — STANDARDIZED SENNA CONCENTRATE AND DOCUSATE SODIUM 2 TABLET: 8.6; 5 TABLET, FILM COATED ORAL at 08:38

## 2017-10-12 RX ADMIN — FUROSEMIDE 20 MG: 20 TABLET ORAL at 09:00

## 2017-10-12 RX ADMIN — CLOPIDOGREL 75 MG: 75 TABLET, FILM COATED ORAL at 08:37

## 2017-10-12 RX ADMIN — INSULIN GLARGINE 15 UNITS: 100 INJECTION, SOLUTION SUBCUTANEOUS at 06:33

## 2017-10-12 RX ADMIN — LOSARTAN POTASSIUM 75 MG: 25 TABLET, FILM COATED ORAL at 08:35

## 2017-10-12 RX ADMIN — POTASSIUM CHLORIDE 20 MEQ: 1500 TABLET, EXTENDED RELEASE ORAL at 08:43

## 2017-10-12 RX ADMIN — FAMOTIDINE 20 MG: 20 TABLET, FILM COATED ORAL at 08:37

## 2017-10-12 ASSESSMENT — ENCOUNTER SYMPTOMS
DIZZINESS: 0
DIAPHORESIS: 0
STRIDOR: 0
SHORTNESS OF BREATH: 0
ORTHOPNEA: 0
WEAKNESS: 0
HEMOPTYSIS: 0
VOMITING: 0
SENSORY CHANGE: 0
HALLUCINATIONS: 0
SPUTUM PRODUCTION: 0
BACK PAIN: 0
PND: 0
FLANK PAIN: 0
COUGH: 0
SPEECH CHANGE: 0
FOCAL WEAKNESS: 0
NAUSEA: 0
ABDOMINAL PAIN: 0
CLAUDICATION: 0
WHEEZING: 0
PALPITATIONS: 0

## 2017-10-12 ASSESSMENT — PAIN SCALES - GENERAL
PAINLEVEL_OUTOF10: 0
PAINLEVEL_OUTOF10: 0

## 2017-10-12 ASSESSMENT — LIFESTYLE VARIABLES: SUBSTANCE_ABUSE: 0

## 2017-10-12 NOTE — PROGRESS NOTES
Cardiology Progress Note               Author: Goldy Parra Date & Time created: 10/12/2017  9:16 AM     72 [facility], light[facility]ness, and dizziness. History of coronary artery disease, ischemic cardiomyopathy, EF 20%, chronic atrial fibrillation, receives care at the [facility], hypertension, diabetes, CVA, AICD implantation (5 years ago)     Consultation for non-STEMI (troponin peaked at 3 )    10/12/17: denies any chest pain or HARMON,   /66  HR 68-85 SR     Labs reviewed   CR = 1.27  Troponin peaked at 3    Coronary angiography 10/8/17 reveal 100% mid LAD, 90% proximal circumflex, distal circumflex occluded, LAD and PTCA of circumflex fill via collateral, 70% proximal ALESSANDRO, EF 23%    ECHO  10/7/17  Normal left ventricular chamber size.  Left ventricular ejection fraction is visually estimated to be 20%.  Severely reduced left ventricular systolic function.  Global hypokinesis. Pacer/ICD wire seen in right ventricle.  Mildly dilated left atrium.  Aortic sclerosis without stenosis. Trace aortic insufficiency.  Mitral annular calcification.  Mild mitral regurgitation. Moderate tricuspid regurgitation.  Estimated right ventricular systolic pressure  is 65 mmHg.  Normal pericardium without effusion.    Review of Systems   Constitutional: Negative for malaise/fatigue.   Respiratory: Negative for cough, hemoptysis, sputum production, shortness of breath and wheezing.    Cardiovascular: Negative for chest pain, palpitations, orthopnea, claudication, leg swelling and PND.   Neurological: Negative for dizziness and weakness.       Physical Exam   Constitutional: He is oriented to person, place, and time. No distress.   HENT:   Head: Normocephalic.   Neck: No JVD present. No thyromegaly present.   Cardiovascular: Normal rate and regular rhythm.    Pulses:       Carotid pulses are 2+ on the right side, and 2+ on the left side.       Radial pulses are 2+ on the right side, and 2+ on  the left side.   Pulmonary/Chest: He has no wheezes.   Abdominal: Soft.   Musculoskeletal: He exhibits no edema.   Neurological: He is alert and oriented to person, place, and time.   Skin: Skin is warm and dry. He is not diaphoretic.       Hemodynamics:  Temp (24hrs), Av.3 °C (97.4 °F), Min:35.9 °C (96.6 °F), Max:36.6 °C (97.9 °F)  Temperature: 36.4 °C (97.5 °F)  Pulse  Av.2  Min: 70  Max: 103   Blood Pressure : 115/66     Respiratory:    Respiration: 16, Pulse Oximetry: 94 %        RUL Breath Sounds: Clear, RML Breath Sounds: Clear, RLL Breath Sounds: Clear, CORBY Breath Sounds: Clear, LLL Breath Sounds: Clear  Fluids:     Weight: 106.8 kg (235 lb 7.2 oz)  GI/Nutrition:  Orders Placed This Encounter   Procedures   • DIET NPO     Standing Status:   Standing     Number of Occurrences:   8     Order Specific Question:   Restrict to:     Answer:   Sips with Medications [3]     Lab Results:  Recent Labs      10/10/17   0249  10/11/17   0302  10/12/17   0254   WBC  11.6*  11.5*  12.0*   RBC  4.59*  4.74  4.71   HEMOGLOBIN  14.3  14.6  14.4   HEMATOCRIT  41.8*  43.2  42.5   MCV  91.1  91.1  90.2   MCH  31.2  30.8  30.6   MCHC  34.2  33.8  33.9   RDW  44.4  43.8  43.8   PLATELETCT  172  196  196   MPV  11.1  11.8  11.5     Recent Labs      10/10/17   0249  10/11/17   0302  10/12/17   0254   SODIUM  135  136  137   POTASSIUM  3.8  3.6  3.4*   CHLORIDE  102  102  103   CO2  21  24  23   GLUCOSE  200*  199*  192*   BUN  18  16  20   CREATININE  1.36  1.27  1.47*   CALCIUM  8.6  8.7  8.5         Recent Labs      10/10/17   0249   BNPBTYPENAT  622*     Recent Labs      10/10/17   0248  10/10/17   0249   TROPONINI  1.00*   --    BNPBTYPENAT   --   622*             Medical Decision Making, by Problem:  Active Hospital Problems    Diagnosis   • *NSTEMI (non-ST elevated myocardial infarction) (CMS-[facility]) [I21.4]   • Diabetes mellitus (CMS-[facility]) [E11.9]   • HTN (hypertension) [I10]   • Chronic systolic CHF (congestive heart  failure) (CMS-[facility]) [I50.22]   • Paroxysmal atrial fibrillation (CMS-HCC) [I48.0]   • Cardiomyopathy (CMS-[facility]) [I42.9]   • Coagulopathy (CMS-HCC) [D68.9]   • Renal failure [N19]   • AICD (automatic cardioverter/defibrillator) present [Z95.810]   • Dyslipidemia [E78.5]       Assessment / Plan:    1. NSTEMI:  - trop peaked at 3 , trending down  - s/p [facility] 10/8/17: 100% LAD, 90 % LCX, 50% RCA,  70% ALESSANDRO, EF 20%  -  thallium study done and pending results   - Plan is to continue with aspirin 81, Plavix 75, losartan 75, Toprol-, Crestor 40, and aldactone 12.5mg qd  - No rhythm issues overnight, normal sinus rhythm    2. PAF:  - currently in NSR, Eliquis on hold   - rate controlled with toprol XL 100mg     3. AICD:    Will follow         Reviewed items::  Medications reviewed and Labs reviewed

## 2017-10-12 NOTE — PROGRESS NOTES
[facility] Hospitalist Progress Note    Date of Service: 10/12/2017    Chief Complaint    72 y.o. male history of ischemic cardiomyopathy, ejection fraction of 20-25% and chronic atrial fibrillation admitted 10/7/2017 with dyspnea and light headedness.     Interval Problem Update    No chest pain or sob. Second part of Thallium study planned.    Consultants/Specialty  Cardio - [place]    Disposition  TBD        Review of Systems   Constitutional: Negative for diaphoresis.   HENT: Negative for congestion.    Respiratory: Negative for cough, shortness of breath and stridor.    Cardiovascular: Negative for chest pain and leg swelling.   Gastrointestinal: Negative for abdominal pain, nausea and vomiting.   Genitourinary: Negative for flank pain and hematuria.   Musculoskeletal: Negative for back pain and joint pain.   Neurological: Negative for sensory change, speech change, focal weakness and weakness.   Psychiatric/Behavioral: Negative for hallucinations and substance abuse.      Physical Exam  Laboratory/Imaging   Hemodynamics  Temp (24hrs), Av.3 °C (97.4 °F), Min:35.9 °C (96.6 °F), Max:36.6 °C (97.9 °F)   Temperature: 36.4 °C (97.5 °F)  Pulse  Av.2  Min: 70  Max: 103    Blood Pressure : 115/66      Respiratory      Respiration: 16, Pulse Oximetry: 94 %        RUL Breath Sounds: Clear, RML Breath Sounds: Clear, RLL Breath Sounds: Clear, CORBY Breath Sounds: Clear, LLL Breath Sounds: Clear    Fluids    Intake/Output Summary (Last 24 hours) at 10/12/17 0759  Last data filed at 10/11/17 2105   Gross per 24 hour   Intake              600 ml   Output              800 ml   Net             -200 ml       Nutrition  Orders Placed This Encounter   Procedures   • DIET NPO     Standing Status:   Standing     Number of Occurrences:   8     Order Specific Question:   Restrict to:     Answer:   Sips with Medications [3]     Physical Exam   Constitutional: He appears well-developed and well-nourished. No distress.   HENT:   Head:  Normocephalic and atraumatic.   Eyes: Conjunctivae and EOM are normal. Right eye exhibits no discharge. Left eye exhibits no discharge. No scleral icterus.   Neck: No JVD present.   Cardiovascular: Normal rate and regular rhythm.    No murmur heard.  Pulmonary/Chest: No stridor. He has no wheezes. He has no rales.   Abdominal: Soft. Bowel sounds are normal. He exhibits no distension. There is no tenderness.   Obese    Musculoskeletal: He exhibits no edema or tenderness.   Neurological: He is alert. No cranial nerve deficit.   approp responses.   No gross focal weakness   Skin: Skin is warm and dry.   Psychiatric:   Calm, cooperative.    Nursing note and vitals reviewed.      Recent Labs      10/10/17   0249  10/11/17   0302  10/12/17   0254   WBC  11.6*  11.5*  12.0*   RBC  4.59*  4.74  4.71   HEMOGLOBIN  14.3  14.6  14.4   HEMATOCRIT  41.8*  43.2  42.5   MCV  91.1  91.1  90.2   MCH  31.2  30.8  30.6   MCHC  34.2  33.8  33.9   RDW  44.4  43.8  43.8   PLATELETCT  172  196  196   MPV  11.1  11.8  11.5     Recent Labs      10/10/17   0249  10/11/17   0302  10/12/17   0254   SODIUM  135  136  137   POTASSIUM  3.8  3.6  3.4*   CHLORIDE  102  102  103   CO2  21  24  23   GLUCOSE  200*  199*  192*   BUN  18  16  20   CREATININE  1.36  1.27  1.47*   CALCIUM  8.6  8.7  8.5         Recent Labs      10/10/17   0249   BNPBTYPENAT  622*              Assessment/Plan     * NSTEMI (non-ST elevated myocardial infarction) (CMS-[facility])   Assessment & Plan    Severe multi vessel  CAD. Previously not surgical candidate for bypass- no chest pain/ dyspnea.   Fu Thallium study results-- cardiology input.   ASA, Crestor, metoprolol.   Imdur          Hypophosphatemia   Assessment & Plan    Corrected  Dc neutriphos         CKD (chronic kidney disease) stage 3, GFR 30-59 ml/min- (present on admission)   Assessment & Plan    Cr rising  Monitor on diuretics         Hypokalemia   Assessment & Plan    K sparing diuretic   Will give one time dose  Kcl.  Monitor electrolytes, renal fxn               Coagulopathy (CMS-HCC)- (present on admission)   Assessment & Plan    No symptoms of active bleed. , Monitoring coags not reliable on Eliquis         Cardiomyopathy (CMS-HCC)- (present on admission)   Assessment & Plan    EF 20 %  Metoprolol, Cozaar, Lasix, Aldactone        Paroxysmal atrial fibrillation (CMS-HCC)- (present on admission)   Assessment & Plan    Remains in sinus rhythm.   Coreg  Holding Eliquis- plan resume per cards recs.  Monitor tele.          Chronic systolic CHF (congestive heart failure) (CMS-HCC)- (present on admission)   Assessment & Plan    w Exah- clinically better.  Continue Metoprolol, Cozaar   Lasix, Aldactone         HTN (hypertension)- (present on admission)   Assessment & Plan    Controlled  Metoprolol, Cozaar  Monitor.         Diabetes mellitus (CMS-HCC)- (present on admission)   Assessment & Plan    Labile blood sugars   SSI, lantus  Holding metformin post cath, renal dysfunction - until improves.          Delirium   Assessment & Plan    Sundowning, no signs acute infxn- improved.   Re assurance /  Orienting   Avoid, benzos, narcotics.        AICD (automatic cardioverter/defibrillator) present- (present on admission)   Assessment & Plan    stable        Dyslipidemia- (present on admission)   Assessment & Plan    Crestor            Reviewed items::  EKG reviewed, Radiology images reviewed, Labs reviewed and Medications reviewed  Garcia catheter::  No Garcia  DVT prophylaxis - mechanical:  SCDs  Ulcer Prophylaxis::  No

## 2017-10-12 NOTE — PROGRESS NOTES
Patient resting in bed, no distress noted, call light in reach, bed alarm on, will monitor.  Monitor summary: SR 68-85, occasional PVC, rare couplet, 20/10/42.

## 2017-10-12 NOTE — PROGRESS NOTES
Patient resting in bed, assessment complete, awake and alert, denies pain.  Scheduled medications given, needs addressed, call light in reach, bed alarm on, will monitor.

## 2017-10-12 NOTE — CARE PLAN
Problem: Safety  Goal: Will remain free from falls    Intervention: Implement fall precautions  Strip alarm on, call light in reach      Problem: Knowledge Deficit  Goal: Knowledge of disease process/condition, treatment plan, diagnostic tests, and medications will improve    Intervention: Explain information regarding disease process/condition, treatment plan, diagnostic tests, and medications and document in education  Patient and family updated on plan of care, verbalized understanding

## 2017-10-13 LAB
ANION GAP SERPL CALC-SCNC: 10 MMOL/L (ref 0–11.9)
BASOPHILS # BLD AUTO: 1 % (ref 0–1.8)
BASOPHILS # BLD: 0.1 K/UL (ref 0–0.12)
BUN SERPL-MCNC: 19 MG/DL (ref 8–22)
CALCIUM SERPL-MCNC: 8.8 MG/DL (ref 8.5–10.5)
CHLORIDE SERPL-SCNC: 104 MMOL/L (ref 96–112)
CO2 SERPL-SCNC: 21 MMOL/L (ref 20–33)
CREAT SERPL-MCNC: 1.24 MG/DL (ref 0.5–1.4)
EOSINOPHIL # BLD AUTO: 0.35 K/UL (ref 0–0.51)
EOSINOPHIL NFR BLD: 3.4 % (ref 0–6.9)
ERYTHROCYTE [DISTWIDTH] IN BLOOD BY AUTOMATED COUNT: 44.2 FL (ref 35.9–50)
GFR SERPL CREATININE-BSD FRML MDRD: 57 ML/MIN/1.73 M 2
GLUCOSE BLD-MCNC: 220 MG/DL (ref 65–99)
GLUCOSE BLD-MCNC: 253 MG/DL (ref 65–99)
GLUCOSE BLD-MCNC: 309 MG/DL (ref 65–99)
GLUCOSE BLD-MCNC: 317 MG/DL (ref 65–99)
GLUCOSE SERPL-MCNC: 247 MG/DL (ref 65–99)
HCT VFR BLD AUTO: 43.2 % (ref 42–52)
HGB BLD-MCNC: 14.7 G/DL (ref 14–18)
IMM GRANULOCYTES # BLD AUTO: 0.08 K/UL (ref 0–0.11)
IMM GRANULOCYTES NFR BLD AUTO: 0.8 % (ref 0–0.9)
LYMPHOCYTES # BLD AUTO: 3.57 K/UL (ref 1–4.8)
LYMPHOCYTES NFR BLD: 34.5 % (ref 22–41)
MCH RBC QN AUTO: 30.9 PG (ref 27–33)
MCHC RBC AUTO-ENTMCNC: 34 G/DL (ref 33.7–35.3)
MCV RBC AUTO: 90.8 FL (ref 81.4–97.8)
MONOCYTES # BLD AUTO: 1.11 K/UL (ref 0–0.85)
MONOCYTES NFR BLD AUTO: 10.7 % (ref 0–13.4)
NEUTROPHILS # BLD AUTO: 5.13 K/UL (ref 1.82–7.42)
NEUTROPHILS NFR BLD: 49.6 % (ref 44–72)
NRBC # BLD AUTO: 0 K/UL
NRBC BLD AUTO-RTO: 0 /100 WBC
PLATELET # BLD AUTO: 183 K/UL (ref 164–446)
PMV BLD AUTO: 11.7 FL (ref 9–12.9)
POTASSIUM SERPL-SCNC: 3.4 MMOL/L (ref 3.6–5.5)
RBC # BLD AUTO: 4.76 M/UL (ref 4.7–6.1)
SODIUM SERPL-SCNC: 135 MMOL/L (ref 135–145)
WBC # BLD AUTO: 10.3 K/UL (ref 4.8–10.8)

## 2017-10-13 PROCEDURE — 82962 GLUCOSE BLOOD TEST: CPT | Mod: 91

## 2017-10-13 PROCEDURE — 97161 PT EVAL LOW COMPLEX 20 MIN: CPT

## 2017-10-13 PROCEDURE — 700102 HCHG RX REV CODE 250 W/ 637 OVERRIDE(OP): Performed by: INTERNAL MEDICINE

## 2017-10-13 PROCEDURE — G8979 MOBILITY GOAL STATUS: HCPCS | Mod: CI

## 2017-10-13 PROCEDURE — 700102 HCHG RX REV CODE 250 W/ 637 OVERRIDE(OP)

## 2017-10-13 PROCEDURE — G8980 MOBILITY D/C STATUS: HCPCS | Mod: CI

## 2017-10-13 PROCEDURE — A9270 NON-COVERED ITEM OR SERVICE: HCPCS | Performed by: INTERNAL MEDICINE

## 2017-10-13 PROCEDURE — 700102 HCHG RX REV CODE 250 W/ 637 OVERRIDE(OP): Performed by: HOSPITALIST

## 2017-10-13 PROCEDURE — A9270 NON-COVERED ITEM OR SERVICE: HCPCS | Performed by: HOSPITALIST

## 2017-10-13 PROCEDURE — A9270 NON-COVERED ITEM OR SERVICE: HCPCS

## 2017-10-13 PROCEDURE — 80048 BASIC METABOLIC PNL TOTAL CA: CPT

## 2017-10-13 PROCEDURE — 36415 COLL VENOUS BLD VENIPUNCTURE: CPT

## 2017-10-13 PROCEDURE — 99232 SBSQ HOSP IP/OBS MODERATE 35: CPT | Performed by: HOSPITALIST

## 2017-10-13 PROCEDURE — 770020 HCHG ROOM/CARE - TELE (206)

## 2017-10-13 PROCEDURE — 85025 COMPLETE CBC W/AUTO DIFF WBC: CPT

## 2017-10-13 PROCEDURE — G8978 MOBILITY CURRENT STATUS: HCPCS | Mod: CI

## 2017-10-13 RX ORDER — POTASSIUM CHLORIDE 20 MEQ/1
20 TABLET, EXTENDED RELEASE ORAL ONCE
Status: COMPLETED | OUTPATIENT
Start: 2017-10-13 | End: 2017-10-13

## 2017-10-13 RX ORDER — NYSTATIN 100000 U/G
CREAM TOPICAL 2 TIMES DAILY
Status: DISCONTINUED | OUTPATIENT
Start: 2017-10-13 | End: 2017-10-14

## 2017-10-13 RX ORDER — INSULIN GLARGINE 100 [IU]/ML
20 INJECTION, SOLUTION SUBCUTANEOUS
Status: DISCONTINUED | OUTPATIENT
Start: 2017-10-14 | End: 2017-10-14 | Stop reason: HOSPADM

## 2017-10-13 RX ORDER — METOPROLOL SUCCINATE 50 MG/1
150 TABLET, EXTENDED RELEASE ORAL DAILY
Status: DISCONTINUED | OUTPATIENT
Start: 2017-10-14 | End: 2017-10-14 | Stop reason: HOSPADM

## 2017-10-13 RX ADMIN — ROSUVASTATIN CALCIUM 40 MG: 20 TABLET, FILM COATED ORAL at 20:43

## 2017-10-13 RX ADMIN — INSULIN GLARGINE 15 UNITS: 100 INJECTION, SOLUTION SUBCUTANEOUS at 06:32

## 2017-10-13 RX ADMIN — INSULIN LISPRO 6 UNITS: 100 INJECTION, SOLUTION INTRAVENOUS; SUBCUTANEOUS at 17:09

## 2017-10-13 RX ADMIN — FUROSEMIDE 20 MG: 20 TABLET ORAL at 08:53

## 2017-10-13 RX ADMIN — INSULIN LISPRO 6 UNITS: 100 INJECTION, SOLUTION INTRAVENOUS; SUBCUTANEOUS at 11:39

## 2017-10-13 RX ADMIN — STANDARDIZED SENNA CONCENTRATE AND DOCUSATE SODIUM 2 TABLET: 8.6; 5 TABLET, FILM COATED ORAL at 08:55

## 2017-10-13 RX ADMIN — POTASSIUM CHLORIDE 20 MEQ: 1500 TABLET, EXTENDED RELEASE ORAL at 17:03

## 2017-10-13 RX ADMIN — INSULIN LISPRO 5 UNITS: 100 INJECTION, SOLUTION INTRAVENOUS; SUBCUTANEOUS at 20:45

## 2017-10-13 RX ADMIN — NYSTATIN: 100000 CREAM TOPICAL at 20:43

## 2017-10-13 RX ADMIN — CLOPIDOGREL 75 MG: 75 TABLET, FILM COATED ORAL at 08:53

## 2017-10-13 RX ADMIN — FAMOTIDINE 20 MG: 20 TABLET, FILM COATED ORAL at 08:53

## 2017-10-13 RX ADMIN — ASPIRIN 81 MG: 81 TABLET, COATED ORAL at 08:53

## 2017-10-13 RX ADMIN — NYSTATIN: 100000 CREAM TOPICAL at 11:27

## 2017-10-13 RX ADMIN — SPIRONOLACTONE 12.5 MG: 25 TABLET, FILM COATED ORAL at 08:54

## 2017-10-13 RX ADMIN — INSULIN LISPRO 3 UNITS: 100 INJECTION, SOLUTION INTRAVENOUS; SUBCUTANEOUS at 06:32

## 2017-10-13 RX ADMIN — POTASSIUM CHLORIDE 20 MEQ: 1500 TABLET, EXTENDED RELEASE ORAL at 09:14

## 2017-10-13 RX ADMIN — FAMOTIDINE 20 MG: 20 TABLET, FILM COATED ORAL at 20:43

## 2017-10-13 RX ADMIN — GABAPENTIN 600 MG: 300 CAPSULE ORAL at 20:43

## 2017-10-13 RX ADMIN — LOSARTAN POTASSIUM 75 MG: 25 TABLET, FILM COATED ORAL at 08:54

## 2017-10-13 RX ADMIN — METOPROLOL SUCCINATE 100 MG: 50 TABLET, EXTENDED RELEASE ORAL at 08:54

## 2017-10-13 RX ADMIN — ISOSORBIDE MONONITRATE 60 MG: 30 TABLET, EXTENDED RELEASE ORAL at 08:53

## 2017-10-13 RX ADMIN — GABAPENTIN 600 MG: 300 CAPSULE ORAL at 08:53

## 2017-10-13 ASSESSMENT — GAIT ASSESSMENTS
GAIT LEVEL OF ASSIST: SUPERVISED
ASSISTIVE DEVICE: FRONT WHEEL WALKER
DISTANCE (FEET): 150

## 2017-10-13 ASSESSMENT — ENCOUNTER SYMPTOMS
HALLUCINATIONS: 0
PALPITATIONS: 0
WEAKNESS: 0
NAUSEA: 0
EYE REDNESS: 0
COUGH: 0
SPEECH CHANGE: 0
EYE DISCHARGE: 0
CHILLS: 0
ORTHOPNEA: 0
CLAUDICATION: 0
FLANK PAIN: 0
SENSORY CHANGE: 0
FEVER: 0
FOCAL WEAKNESS: 0
DIAPHORESIS: 0
VOMITING: 0
SHORTNESS OF BREATH: 0
STRIDOR: 0
ABDOMINAL PAIN: 0
PND: 0
BACK PAIN: 0

## 2017-10-13 ASSESSMENT — COGNITIVE AND FUNCTIONAL STATUS - GENERAL
MOBILITY SCORE: 23
SUGGESTED CMS G CODE MODIFIER MOBILITY: CI
CLIMB 3 TO 5 STEPS WITH RAILING: A LITTLE

## 2017-10-13 ASSESSMENT — PAIN SCALES - GENERAL
PAINLEVEL_OUTOF10: 0

## 2017-10-13 ASSESSMENT — LIFESTYLE VARIABLES
DO YOU DRINK ALCOHOL: NO
SUBSTANCE_ABUSE: 0
DO YOU DRINK ALCOHOL: NO

## 2017-10-13 NOTE — PROGRESS NOTES
Report received from day shift RN, pt is sitting on edge of bed, wife is at the bedside, updated on POC, call light and personal belongings are in reach, hourly rounding in place.

## 2017-10-13 NOTE — PROGRESS NOTES
Cardiology Progress Note               Author: Goldy Parra Date & Time created: 10/13/2017  10:15 AM     Consultation for non-STEMI (troponin peaked at 3 )    Admitted with: [facility], [facility]iness        History of coronary artery disease, ischemic cardiomyopathy, EF 20%, chronic atrial fibrillation, receives care at the [facility], hypertension, diabetes, CVA, AICD implantation (5 years ago)          Labs reviewed   K = 3.4, replace  CR = 1.24  Troponin peaked at 3        BP = 134/50  HR = 89 NSR       Coronary angiography 10/8/17 reveal 100% mid LAD, 90% proximal circumflex, distal circumflex occluded, LAD and PTCA of circumflex fill via collateral, 70% proximal ALESSANDRO, EF 23%    Review of Systems   Respiratory: Negative for cough and shortness of breath.    Cardiovascular: Negative for chest pain, palpitations, orthopnea, claudication, leg swelling and PND.       Physical Exam   Constitutional: He is oriented to person, place, and time.   HENT:   Head: Normocephalic.   Neck: No JVD present. No thyromegaly present.   Cardiovascular: Normal rate and regular rhythm.    Pulses:       Carotid pulses are 2+ on the right side, and 2+ on the left side.       Radial pulses are 2+ on the right side, and 2+ on the left side.   Pulmonary/Chest: He has no wheezes.   Abdominal: Soft.   Musculoskeletal: He exhibits no edema.   Neurological: He is alert and oriented to person, place, and time.   Skin: Skin is warm and dry.   Right radial Side pain/stress/intact  Good circulation       Hemodynamics:  Temp (24hrs), Av.3 °C (97.4 °F), Min:35.9 °C (96.7 °F), Max:36.7 °C (98.1 °F)  Temperature: 35.9 °C (96.7 °F)  Pulse  Av.5  Min: 60  Max: 103   Blood Pressure : 134/49     Respiratory:    Respiration: 16, Pulse Oximetry: 95 %        RUL Breath Sounds: Clear, RML Breath Sounds: Clear, RLL Breath Sounds: Crackles, CORBY Breath Sounds: Clear, LLL Breath Sounds: Crackles  Fluids:      Weight: 100.8 kg (222 lb 3.6 oz)  GI/Nutrition:  Orders Placed This Encounter   Procedures   • DIET ORDER     Standing Status:   Standing     Number of Occurrences:   1     Order Specific Question:   Diet:     Answer:   Cardiac [6]     Lab Results:  Recent Labs      10/11/17   0302  10/12/17   0254  10/13/17   0357   WBC  11.5*  12.0*  10.3   RBC  4.74  4.71  4.76   HEMOGLOBIN  14.6  14.4  14.7   HEMATOCRIT  43.2  42.5  43.2   MCV  91.1  90.2  90.8   MCH  30.8  30.6  30.9   MCHC  33.8  33.9  34.0   RDW  43.8  43.8  44.2   PLATELETCT  196  196  183   MPV  11.8  11.5  11.7     Recent Labs      10/11/17   0302  10/12/17   0254  10/13/17   0357   SODIUM  136  137  135   POTASSIUM  3.6  3.4*  3.4*   CHLORIDE  102  103  104   CO2  24  23  21   GLUCOSE  199*  192*  247*   BUN  16  20  19   CREATININE  1.27  1.47*  1.24   CALCIUM  8.7  8.5  8.8                         Medical Decision Making, by Problem:  Active Hospital Problems    Diagnosis   • *NSTEMI (non-ST elevated myocardial infarction) (CMS-[facility]) [I21.4]   • Diabetes mellitus (CMS-[facility]) [E11.9]   • HTN (hypertension) [I10]   • Chronic systolic CHF (congestive heart failure) (CMS-[facility]) [I50.22]   • Paroxysmal atrial fibrillation (CMS-[facility]) [I48.0]   • Cardiomyopathy (CMS-HCC) [I42.9]   • Coagulopathy (CMS-[facility]) [D68.9]   • Renal failure [N19]   • AICD (automatic cardioverter/defibrillator) present [Z95.810]   • Dyslipidemia [E78.5]       Assessment / Plan:      NSTEMI ( trop peaked at 3 , trending down ) s/p C 10/8/17  100% LAD, 90 % LCX, , 50% RCA,  70% ALESSANDRO, EF 20%    On isosorbide 60 mg, no angina with exertion    Viability study 10/11/17   revealed no viable myocardium in the apical and periapical scar, will discuss with Dr Ybarra       K=3.5, replace    Plan is to continue with aspirin 81, Plavix 75, losartan 75 mg , spironolactone 12.5, Toprol- milligrams, Crestor 40 mg    No rhythm issues overnight, normal sinus rhythm    PAF, currently in NSR, Eliquis  on hold since cath , has not been started being on ASA and Plavix , maintaining  NSR , but  need to evaluate prior to DC     May have to go home on triple therapy  ASA, Plavix, Eliquis        Reviewed items::  Medications reviewed and Labs reviewed

## 2017-10-13 NOTE — PROGRESS NOTES
Skin check performed discovery of mult open areas in groin area and left side of scrotal area.  Pictures taken by KARLY Lyman and uploaded into Epic.

## 2017-10-13 NOTE — CARE PLAN
Problem: Safety  Goal: Will remain free from injury  Outcome: PROGRESSING AS EXPECTED  Pt educated about calling before getting out of bed. Call light and personal belongings within the reach. Bed in lowest position, treaded socks on.     Problem: Knowledge Deficit  Goal: Knowledge of disease process/condition, treatment plan, diagnostic tests, and medications will improve  Outcome: PROGRESSING AS EXPECTED  All questions and concerns addressed. Pt educated about medications, condition and treatment plan.

## 2017-10-13 NOTE — THERAPY
"71 y/o male adm for SOB, dx: NSTEMI, EF 20-25% without signs of SOB  during functional mobility. Intact strength and balance during amb with FWW on lvele ground. Pt supervision while in acute setting by nursing. No further acute PT services required at this time  Physical Therapy Evaluation completed.   Bed Mobility:  Supine to Sit: Supervised  Transfers: Sit to Stand: Supervised  Gait: Level Of Assist: Supervised with Front-Wheel Walker       Plan of Care: Patient with no further skilled PT needs in the acute care setting at this time  Discharge Recommendations: Equipment: No Equipment Needed. Post-acute therapy Discharge to home with outpatient or home health for additional skilled therapy services.    See \"Rehab Therapy-Acute\" Patient Summary Report for complete documentation.   .  "

## 2017-10-13 NOTE — PROGRESS NOTES
Pt is resting in bed, denies any pain, juice provided, wife is at the bedside, call light and personal belongings are in reach, bed alarm, treaded slipper socks, and hourly rounding in place.

## 2017-10-13 NOTE — CARE PLAN
Problem: Communication  Goal: The ability to communicate needs accurately and effectively will improve  Outcome: PROGRESSING AS EXPECTED  RN updated pt and family on POC, answered all questions, provided updates throughout shift, provided education on call light and encouraged pt to call when in need of assistance, encouraged pt to take active role in health care planning.     Problem: Infection  Goal: Will remain free from infection  Outcome: PROGRESSING AS EXPECTED  RN educated pt and family on infection prevention, RN used thorough hand hygiene before and after interactions with pt, encouraged pt and family to use proper hand hygiene.

## 2017-10-13 NOTE — PROGRESS NOTES
Report received.  Assumed Care.  Pt in bed, family present. AOx4, responds appropriately.  Denies pain, SOB.  Plan of care discussed.  Explained importance of calling before getting OOB and pt verbalizes understanding.  Call light and belongings within reach, treaded slipper socks on, bed alarm in use, bed in lowest position.  Will monitor frequently.

## 2017-10-13 NOTE — PROGRESS NOTES
[facility]    Date of Service: 10/13/2017    Chief Complaint    72 y.o. male history of ischemic cardiomyopathy, ejection fraction of 20-25% and chronic atrial fibrillation admitted 10/7/2017 with dyspnea and light headedness.     Interval Problem Update    No chest pain, tele sinus. Hypokalemic. Uncontrolled blood sugars in 2-300s.     Consultants/Specialty  Cardio - [place]    Disposition  TBD        Review of Systems   Constitutional: Negative for chills, diaphoresis and fever.   HENT: Negative for congestion.    Eyes: Negative for discharge and redness.   Respiratory: Negative for cough, shortness of breath and stridor.    Cardiovascular: Negative for chest pain and leg swelling.   Gastrointestinal: Negative for abdominal pain, nausea and vomiting.   Genitourinary: Negative for flank pain and hematuria.   Musculoskeletal: Negative for back pain and joint pain.   Neurological: Negative for sensory change, speech change, focal weakness and weakness.   Psychiatric/Behavioral: Negative for hallucinations and substance abuse.      Physical Exam  Laboratory/Imaging   Hemodynamics  Temp (24hrs), Av.4 °C (97.6 °F), Min:36.2 °C (97.2 °F), Max:36.7 °C (98.1 °F)   Temperature: 36.7 °C (98.1 °F)  Pulse  Av.8  Min: 60  Max: 103    Blood Pressure : 131/79      Respiratory      Respiration: 16, Pulse Oximetry: 96 %        RUL Breath Sounds: Clear, RML Breath Sounds: Clear, RLL Breath Sounds: Clear, CORBY Breath Sounds: Clear, LLL Breath Sounds: Clear    Fluids    Intake/Output Summary (Last 24 hours) at 10/13/17 0837  Last data filed at 10/12/17 2000   Gross per 24 hour   Intake              510 ml   Output                0 ml   Net              510 ml       Nutrition  Orders Placed This Encounter   Procedures   • DIET ORDER     Standing Status:   Standing     Number of Occurrences:   1     Order Specific Question:   Diet:     Answer:   Cardiac [6]     Physical Exam   Constitutional: He appears  well-developed and well-nourished. No distress.   HENT:   Head: Normocephalic and atraumatic.   Eyes: Conjunctivae and EOM are normal. Right eye exhibits no discharge. Left eye exhibits no discharge. No scleral icterus.   Cardiovascular: Normal rate and regular rhythm.    No murmur heard.  Pulmonary/Chest: He has no wheezes. He has no rales.   Abdominal: Soft. Bowel sounds are normal. He exhibits no distension. There is no tenderness.   Obese    Musculoskeletal: He exhibits no edema or tenderness.   Neurological: He is alert. No cranial nerve deficit.   approp responses.   No gross focal weakness   Skin: Skin is warm and dry. He is not diaphoretic.   Psychiatric: He has a normal mood and affect.   Calm, cooperative.    Nursing note and vitals reviewed.      Recent Labs      10/11/17   0302  10/12/17   0254  10/13/17   0357   WBC  11.5*  12.0*  10.3   RBC  4.74  4.71  4.76   HEMOGLOBIN  14.6  14.4  14.7   HEMATOCRIT  43.2  42.5  43.2   MCV  91.1  90.2  90.8   MCH  30.8  30.6  30.9   MCHC  33.8  33.9  34.0   RDW  43.8  43.8  44.2   PLATELETCT  196  196  183   MPV  11.8  11.5  11.7     Recent Labs      10/11/17   0302  10/12/17   0254  10/13/17   0357   SODIUM  136  137  135   POTASSIUM  3.6  3.4*  3.4*   CHLORIDE  102  103  104   CO2  24  23  21   GLUCOSE  199*  192*  247*   BUN  16  20  19   CREATININE  1.27  1.47*  1.24   CALCIUM  8.7  8.5  8.8                      Assessment/Plan     * NSTEMI (non-ST elevated myocardial infarction) (CMS-[facility])   Assessment & Plan    Severe multi vessel  CAD. Previously not surgical candidate for bypass- no new chest pain/ dyspnea.   Fu Thallium study results-- await cards input.   Optimize medications w ASA, Crestor, metoprolol.   Long acting nitrate.          Hypophosphatemia   Assessment & Plan    Corrected          CKD (chronic kidney disease) stage 3, GFR 30-59 ml/min- (present on admission)   Assessment & Plan    Monitor renal fxn        Hypokalemia   Assessment & Plan    K  sparing diuretic   Add low dose Kcl  Monitor electrolytes, renal fxn               Coagulopathy (CMS-HCC)- (present on admission)   Assessment & Plan    No symptoms of active bleed. , Monitoring coags not reliable on Eliquis         Cardiomyopathy (CMS-HCC)- (present on admission)   Assessment & Plan    EF 20 %- improved symptoms.   Metoprolol, Cozaar, Lasix, Aldactone        Paroxysmal atrial fibrillation (CMS-HCC)- (present on admission)   Assessment & Plan    Controlled/  sinus rhythm.   Metoprolol   Resume  Eliquis when ok w cards.  Monitor tele.          Chronic systolic CHF (congestive heart failure) (CMS-HCC)- (present on admission)   Assessment & Plan    w Exah- clinically better.  Continue Metoprolol, Cozaar, Lasix, Aldactone         HTN (hypertension)- (present on admission)   Assessment & Plan    Controlled  Metoprolol, Cozaar  Monitor.         Diabetes mellitus (CMS-HCC)- (present on admission)   Assessment & Plan    Uncontrolled    SSI-- increase Lantus  Holding metformin post cath, renal dysfunction - until improves.           Delirium   Assessment & Plan    Sundowning, no signs acute infxn- improved.   Re assurance /  Orienting   Avoid, benzos, narcotics.        AICD (automatic cardioverter/defibrillator) present- (present on admission)   Assessment & Plan    stable        Dyslipidemia- (present on admission)   Assessment & Plan    Crestor            Reviewed items::  EKG reviewed, Radiology images reviewed, Labs reviewed and Medications reviewed  Garcia catheter::  No Garcia  DVT prophylaxis - mechanical:  SCDs  Ulcer Prophylaxis::  No      Discussed with wife plan of care.

## 2017-10-14 VITALS
OXYGEN SATURATION: 97 % | SYSTOLIC BLOOD PRESSURE: 122 MMHG | DIASTOLIC BLOOD PRESSURE: 55 MMHG | RESPIRATION RATE: 18 BRPM | HEART RATE: 72 BPM | TEMPERATURE: 97.2 F | WEIGHT: 224.21 LBS | BODY MASS INDEX: 35.19 KG/M2 | HEIGHT: 67 IN

## 2017-10-14 LAB
ANION GAP SERPL CALC-SCNC: 10 MMOL/L (ref 0–11.9)
BASOPHILS # BLD AUTO: 0.9 % (ref 0–1.8)
BASOPHILS # BLD: 0.11 K/UL (ref 0–0.12)
BUN SERPL-MCNC: 22 MG/DL (ref 8–22)
CALCIUM SERPL-MCNC: 9.1 MG/DL (ref 8.5–10.5)
CHLORIDE SERPL-SCNC: 101 MMOL/L (ref 96–112)
CO2 SERPL-SCNC: 22 MMOL/L (ref 20–33)
CREAT SERPL-MCNC: 1.62 MG/DL (ref 0.5–1.4)
EOSINOPHIL # BLD AUTO: 0.36 K/UL (ref 0–0.51)
EOSINOPHIL NFR BLD: 3.1 % (ref 0–6.9)
ERYTHROCYTE [DISTWIDTH] IN BLOOD BY AUTOMATED COUNT: 42.9 FL (ref 35.9–50)
GFR SERPL CREATININE-BSD FRML MDRD: 42 ML/MIN/1.73 M 2
GLUCOSE BLD-MCNC: 163 MG/DL (ref 65–99)
GLUCOSE BLD-MCNC: 388 MG/DL (ref 65–99)
GLUCOSE SERPL-MCNC: 165 MG/DL (ref 65–99)
HCT VFR BLD AUTO: 43.7 % (ref 42–52)
HGB BLD-MCNC: 14.8 G/DL (ref 14–18)
IMM GRANULOCYTES # BLD AUTO: 0.07 K/UL (ref 0–0.11)
IMM GRANULOCYTES NFR BLD AUTO: 0.6 % (ref 0–0.9)
LYMPHOCYTES # BLD AUTO: 4.16 K/UL (ref 1–4.8)
LYMPHOCYTES NFR BLD: 35.8 % (ref 22–41)
MCH RBC QN AUTO: 30.6 PG (ref 27–33)
MCHC RBC AUTO-ENTMCNC: 33.9 G/DL (ref 33.7–35.3)
MCV RBC AUTO: 90.5 FL (ref 81.4–97.8)
MONOCYTES # BLD AUTO: 1.11 K/UL (ref 0–0.85)
MONOCYTES NFR BLD AUTO: 9.5 % (ref 0–13.4)
NEUTROPHILS # BLD AUTO: 5.82 K/UL (ref 1.82–7.42)
NEUTROPHILS NFR BLD: 50.1 % (ref 44–72)
NRBC # BLD AUTO: 0 K/UL
NRBC BLD AUTO-RTO: 0 /100 WBC
PLATELET # BLD AUTO: 222 K/UL (ref 164–446)
PMV BLD AUTO: 11.7 FL (ref 9–12.9)
POTASSIUM SERPL-SCNC: 3.6 MMOL/L (ref 3.6–5.5)
RBC # BLD AUTO: 4.83 M/UL (ref 4.7–6.1)
SODIUM SERPL-SCNC: 133 MMOL/L (ref 135–145)
WBC # BLD AUTO: 11.6 K/UL (ref 4.8–10.8)

## 2017-10-14 PROCEDURE — 36415 COLL VENOUS BLD VENIPUNCTURE: CPT

## 2017-10-14 PROCEDURE — 82962 GLUCOSE BLOOD TEST: CPT | Mod: 91

## 2017-10-14 PROCEDURE — 700111 HCHG RX REV CODE 636 W/ 250 OVERRIDE (IP): Performed by: HOSPITALIST

## 2017-10-14 PROCEDURE — 700102 HCHG RX REV CODE 250 W/ 637 OVERRIDE(OP): Performed by: HOSPITALIST

## 2017-10-14 PROCEDURE — 99239 HOSP IP/OBS DSCHRG MGMT >30: CPT | Performed by: HOSPITALIST

## 2017-10-14 PROCEDURE — A9270 NON-COVERED ITEM OR SERVICE: HCPCS

## 2017-10-14 PROCEDURE — A9270 NON-COVERED ITEM OR SERVICE: HCPCS | Performed by: HOSPITALIST

## 2017-10-14 PROCEDURE — 85025 COMPLETE CBC W/AUTO DIFF WBC: CPT

## 2017-10-14 PROCEDURE — 700102 HCHG RX REV CODE 250 W/ 637 OVERRIDE(OP): Performed by: INTERNAL MEDICINE

## 2017-10-14 PROCEDURE — 90471 IMMUNIZATION ADMIN: CPT

## 2017-10-14 PROCEDURE — A9270 NON-COVERED ITEM OR SERVICE: HCPCS | Performed by: INTERNAL MEDICINE

## 2017-10-14 PROCEDURE — 80048 BASIC METABOLIC PNL TOTAL CA: CPT

## 2017-10-14 PROCEDURE — 90670 PCV13 VACCINE IM: CPT | Performed by: HOSPITALIST

## 2017-10-14 PROCEDURE — 700102 HCHG RX REV CODE 250 W/ 637 OVERRIDE(OP)

## 2017-10-14 RX ORDER — CLOPIDOGREL BISULFATE 75 MG/1
75 TABLET ORAL DAILY
Qty: 30 TAB | Refills: 2 | Status: SHIPPED | OUTPATIENT
Start: 2017-10-14 | End: 2017-10-14

## 2017-10-14 RX ORDER — ROSUVASTATIN CALCIUM 40 MG/1
40 TABLET, COATED ORAL EVERY EVENING
Qty: 30 TAB | Refills: 2 | Status: SHIPPED | OUTPATIENT
Start: 2017-10-14

## 2017-10-14 RX ORDER — NYSTATIN 100000 [USP'U]/G
POWDER TOPICAL 2 TIMES DAILY
Status: DISCONTINUED | OUTPATIENT
Start: 2017-10-14 | End: 2017-10-14 | Stop reason: HOSPADM

## 2017-10-14 RX ORDER — CLOPIDOGREL BISULFATE 75 MG/1
75 TABLET ORAL DAILY
Qty: 30 TAB | Refills: 2 | Status: SHIPPED | OUTPATIENT
Start: 2017-10-14

## 2017-10-14 RX ORDER — METOPROLOL SUCCINATE 50 MG/1
150 TABLET, EXTENDED RELEASE ORAL DAILY
Qty: 30 TAB | Refills: 1
Start: 2017-10-14

## 2017-10-14 RX ORDER — NYSTATIN 100000 U/G
1 CREAM TOPICAL 2 TIMES DAILY
Qty: 1 G | Refills: 0 | Status: SHIPPED | OUTPATIENT
Start: 2017-10-14 | End: 2017-10-19

## 2017-10-14 RX ORDER — ASPIRIN 81 MG/1
81 TABLET ORAL DAILY
Qty: 30 TAB | Refills: 1 | Status: SHIPPED | OUTPATIENT
Start: 2017-10-14 | End: 2017-10-14

## 2017-10-14 RX ORDER — INSULIN GLARGINE 100 [IU]/ML
20 INJECTION, SOLUTION SUBCUTANEOUS EVERY MORNING
Qty: 10 ML | Refills: 2 | Status: SHIPPED | OUTPATIENT
Start: 2017-10-14

## 2017-10-14 RX ORDER — ROSUVASTATIN CALCIUM 40 MG/1
40 TABLET, COATED ORAL EVERY EVENING
Qty: 30 TAB | Refills: 2
Start: 2017-10-14 | End: 2017-10-14

## 2017-10-14 RX ADMIN — SPIRONOLACTONE 12.5 MG: 25 TABLET, FILM COATED ORAL at 09:45

## 2017-10-14 RX ADMIN — INSULIN LISPRO 8 UNITS: 100 INJECTION, SOLUTION INTRAVENOUS; SUBCUTANEOUS at 11:11

## 2017-10-14 RX ADMIN — FAMOTIDINE 20 MG: 20 TABLET, FILM COATED ORAL at 09:45

## 2017-10-14 RX ADMIN — PNEUMOCOCCAL 13-VALENT CONJUGATE VACCINE 0.5 ML: 2.2; 2.2; 2.2; 2.2; 2.2; 4.4; 2.2; 2.2; 2.2; 2.2; 2.2; 2.2; 2.2 INJECTION, SUSPENSION INTRAMUSCULAR at 11:06

## 2017-10-14 RX ADMIN — STANDARDIZED SENNA CONCENTRATE AND DOCUSATE SODIUM 2 TABLET: 8.6; 5 TABLET, FILM COATED ORAL at 09:44

## 2017-10-14 RX ADMIN — ASPIRIN 81 MG: 81 TABLET, COATED ORAL at 09:43

## 2017-10-14 RX ADMIN — INSULIN LISPRO 2 UNITS: 100 INJECTION, SOLUTION INTRAVENOUS; SUBCUTANEOUS at 06:21

## 2017-10-14 RX ADMIN — ISOSORBIDE MONONITRATE 60 MG: 30 TABLET, EXTENDED RELEASE ORAL at 09:44

## 2017-10-14 RX ADMIN — GABAPENTIN 600 MG: 300 CAPSULE ORAL at 09:45

## 2017-10-14 RX ADMIN — LOSARTAN POTASSIUM 75 MG: 25 TABLET, FILM COATED ORAL at 09:44

## 2017-10-14 RX ADMIN — METOPROLOL SUCCINATE 150 MG: 50 TABLET, EXTENDED RELEASE ORAL at 09:43

## 2017-10-14 RX ADMIN — CLOPIDOGREL 75 MG: 75 TABLET, FILM COATED ORAL at 09:45

## 2017-10-14 RX ADMIN — INSULIN GLARGINE 20 UNITS: 100 INJECTION, SOLUTION SUBCUTANEOUS at 06:20

## 2017-10-14 RX ADMIN — FUROSEMIDE 20 MG: 20 TABLET ORAL at 09:44

## 2017-10-14 ASSESSMENT — PAIN SCALES - GENERAL
PAINLEVEL_OUTOF10: 0

## 2017-10-14 ASSESSMENT — LIFESTYLE VARIABLES: EVER_SMOKED: NEVER

## 2017-10-14 NOTE — CARE PLAN
Problem: Safety  Goal: Will remain free from falls  Outcome: PROGRESSING AS EXPECTED  Call bell within reach and bed alarm activated    Problem: Knowledge Deficit  Goal: Knowledge of disease process/condition, treatment plan, diagnostic tests, and medications will improve  Outcome: PROGRESSING AS EXPECTED  Plan of care reviewed with pt

## 2017-10-14 NOTE — WOUND TEAM
Patient seen for wound consult of groin.  Area assessed and cleansed.  Adhesive foam applied to right upper groin partial thickness wound and interdry to groin and under scrotum.  Patient has been edematous and has moisture related skin breakdown, Nystatin powder also ordered to use with interdy.

## 2017-10-14 NOTE — PROGRESS NOTES
Assumed pt care @ 1900. Received bedside shift report. Plan of care reviewed with pt. Call bell within reach. Bed alarm activated . Safety precautions in place. Will cont to monitor

## 2017-10-14 NOTE — PROGRESS NOTES
Bedside report received, Pt care assumed. Tele box on. VSS. Pt assessment complete. Pt AOX4, no signs of distress noted at this time.  Pt c/o of 0/10 pain. Pt denies any additional needs at this time. Bed in lowest position, bed alarm is on, ambulates with stand by assistance. POC discussed with Pt/family, verbalizes understanding, no questions at this time. Pt educated on fall risk and verbalizes understanding, call light within reach, will continue to monitor.

## 2017-10-14 NOTE — PROGRESS NOTES
Discharge instructions given and discussed. Pt verbalized understanding and all questions answered. Paper prescriptions given. Pt discharged home in stable condition via wheelchair with wife. Personal belongings verified patient. VSS, IV removed and tolerated well. Tele box removed, monitor room notified.

## 2017-10-14 NOTE — DISCHARGE INSTRUCTIONS
Discharge Instructions    Discharged to home by car with relative. Discharged via wheelchair, hospital escort: Yes.  Special equipment needed: Not Applicable    Be sure to schedule a follow-up appointment with your primary care doctor or any specialists as instructed.     Discharge Plan:   Diet Plan: Discussed  Activity Level: Discussed  Confirmed Follow up Appointment: Appointment Scheduled  Confirmed Symptoms Management: Discussed  Medication Reconciliation Updated: Yes  Pneumococcal Vaccine Given - only chart on this line when given: Given (See MAR)  Influenza Vaccine Indication: Not indicated: Previously immunized this influenza season and > 8 years of age  Influenza Vaccine Given - only chart on this line when given: Influenza Vaccine Given (See MAR)    I understand that a diet low in cholesterol, fat, and sodium is recommended for good health. Unless I have been given specific instructions below for another diet, I accept this instruction as my diet prescription.   Other diet: Heart-Healthy Eating Plan  Many factors influence your heart health, including eating and exercise habits. Heart (coronary) risk increases with abnormal blood fat (lipid) levels. Heart-healthy meal planning includes limiting unhealthy fats, increasing healthy fats, and making other small dietary changes. This includes maintaining a healthy body weight to help keep lipid levels within a normal range.  WHAT IS MY PLAN?   Your health care provider recommends that you:  · Get no more than _________% of the total calories in your daily diet from fat.  · Limit your intake of saturated fat to less than _________% of your total calories each day.  · Limit the amount of cholesterol in your diet to less than _________ mg per day.  WHAT TYPES OF FAT SHOULD I CHOOSE?  · Choose healthy fats more often. Choose monounsaturated and polyunsaturated fats, such as olive oil and canola oil, flaxseeds, walnuts, almonds, and seeds.  · Eat more omega-3 fats.  "Good choices include salmon, mackerel, sardines, tuna, flaxseed oil, and ground flaxseeds. Aim to eat fish at least two times each week.  · Limit saturated fats. Saturated fats are primarily found in animal products, such as meats, butter, and cream. Plant sources of saturated fats include palm oil, palm kernel oil, and coconut oil.  · Avoid foods with partially hydrogenated oils in them. These contain trans fats. Examples of foods that contain trans fats are stick margarine, some tub margarines, cookies, crackers, and other baked goods.  WHAT GENERAL GUIDELINES DO I NEED TO FOLLOW?  · Check food labels carefully to identify foods with trans fats or high amounts of saturated fat.  · Fill one half of your plate with vegetables and green salads. Eat 4-5 servings of vegetables per day. A serving of vegetables equals 1 cup of raw leafy vegetables, ½ cup of raw or cooked cut-up vegetables, or ½ cup of vegetable juice.  · Fill one fourth of your plate with whole grains. Look for the word \"whole\" as the first word in the ingredient list.  · Fill one fourth of your plate with lean protein foods.  · Eat 4-5 servings of fruit per day. A serving of fruit equals one medium whole fruit, ¼ cup of dried fruit, ½ cup of fresh, frozen, or canned fruit, or ½ cup of 100% fruit juice.  · Eat more foods that contain soluble fiber. Examples of foods that contain this type of fiber are apples, broccoli, carrots, beans, peas, and barley. Aim to get 20-30 g of fiber per day.  · Eat more home-cooked food and less restaurant, buffet, and fast food.  · Limit or avoid alcohol.  · Limit foods that are high in starch and sugar.  · Avoid fried foods.  · Cook foods by using methods other than frying. Baking, boiling, grilling, and broiling are all great options. Other fat-reducing suggestions include:  ¨ Removing the skin from poultry.  ¨ Removing all visible fats from meats.  ¨ Skimming the fat off of stews, soups, and gravies before serving " them.  ¨ Steaming vegetables in water or broth.  · Lose weight if you are overweight. Losing just 5-10% of your initial body weight can help your overall health and prevent diseases such as diabetes and heart disease.  · Increase your consumption of nuts, legumes, and seeds to 4-5 servings per week. One serving of dried beans or legumes equals ½ cup after being cooked, one serving of nuts equals 1½ ounces, and one serving of seeds equals ½ ounce or 1 tablespoon.  · You may need to monitor your salt (sodium) intake, especially if you have high blood pressure. Talk with your health care provider or dietitian to get more information about reducing sodium.  WHAT FOODS CAN I EAT?  Grains  Breads, including [place], white, carlos, wheat, raisin, rye, oatmeal, and Italian. Tortillas that are neither fried nor made with lard or trans fat. Low-fat rolls, including hotdog and hamburger buns and English muffins. Biscuits. Muffins. Waffles. Pancakes. Light popcorn. Whole-grain cereals. Flatbread. [place] toast. Pretzels. Breadsticks. Rusks. Low-fat snacks and crackers, including oyster, saltine, matzo, mars, animal, and rye. Rice and pasta, including brown rice and those that are made with whole wheat.  Vegetables  All vegetables.  Fruits  All fruits, but limit coconut.  Meats and Other Protein Sources  Lean, well-trimmed beef, veal, pork, and lamb. Chicken and turkey without skin. All fish and shellfish. Wild duck, rabbit, pheasant, and venison. Egg whites or low-cholesterol egg substitutes. Dried beans, peas, lentils, and tofu. Seeds and most nuts.  Dairy  Low-fat or nonfat cheeses, including ricotta, string, and mozzarella. Skim or 1% milk that is liquid, powdered, or evaporated. Buttermilk that is made with low-fat milk. Nonfat or low-fat yogurt.  Beverages  Mineral water. Diet carbonated beverages.  Sweets and Desserts  Sherbets and fruit ices. Honey, jam, marmalade, jelly, and syrups. Meringues and gelatins. Pure sugar  candy, such as hard candy, jelly beans, gumdrops, mints, marshmallows, and small amounts of dark chocolate. Ziggy food cake.  Eat all sweets and desserts in moderation.  Fats and Oils  Nonhydrogenated (trans-free) margarines. Vegetable oils, including soybean, sesame, sunflower, olive, peanut, safflower, corn, canola, and cottonseed. Salad dressings or mayonnaise that are made with a vegetable oil. Limit added fats and oils that you use for cooking, baking, salads, and as spreads.  Other  Cocoa powder. Coffee and tea. All seasonings and condiments.  The items listed above may not be a complete list of recommended foods or beverages. Contact your dietitian for more options.  WHAT FOODS ARE NOT RECOMMENDED?  Grains  Breads that are made with saturated or trans fats, oils, or whole milk. Croissants. Butter rolls. Cheese breads. Sweet rolls. Donuts. Buttered popcorn. Chow mein noodles. High-fat crackers, such as cheese or butter crackers.  Meats and Other Protein Sources  Fatty meats, such as hotdogs, short ribs, sausage, spareribs, astudillo, ribeye roast or steak, and mutton. High-fat deli meats, such as salami and bologna. Caviar. Domestic duck and goose. Organ meats, such as kidney, liver, sweetbreads, brains, gizzard, chitterlings, and heart.  Dairy  Cream, sour cream, cream cheese, and creamed cottage cheese. Whole milk cheeses, including blue (gonzalo), Coles Sunny, Brie, Kevin, American, Havarti, Swiss, cheddar, Camembert, and [place].  Whole or 2% milk that is liquid, evaporated, or condensed. Whole buttermilk. Cream sauce or high-fat cheese sauce. Yogurt that is made from whole milk.  Beverages  Regular sodas and drinks with added sugar.  Sweets and Desserts  Frosting. Pudding. Cookies. Cakes other than ziggy food cake. Candy that has milk chocolate or white chocolate, hydrogenated fat, butter, coconut, or unknown ingredients. Buttered syrups. Full-fat ice cream or ice cream drinks.  Fats and Oils  Gravy that has  suet, meat fat, or shortening. Cocoa butter, hydrogenated oils, palm oil, coconut oil, palm kernel oil. These can often be found in baked products, candy, fried foods, nondairy creamers, and whipped toppings. Solid fats and shortenings, including astudillo fat, salt pork, lard, and butter. Nondairy cream substitutes, such as coffee creamers and sour cream substitutes. Salad dressings that are made of unknown oils, cheese, or sour cream.  The items listed above may not be a complete list of foods and beverages to avoid. Contact your dietitian for more information.     This information is not intended to replace advice given to you by your health care provider. Make sure you discuss any questions you have with your health care provider.     Document Released: 09/26/2009 Document Revised: 01/08/2016 Document Reviewed: 06/11/2015  [employer] Interactive Patient Education ©2016 [employer]      Special Instructions: Non-ST Segment Elevation Heart Attack  A heart attack (myocardial infarction) happens when some of the heart muscle is injured or dies because it does not get enough oxygen. A non-ST segment elevation heart attack is a type of heart attack. It happens when the body does not get enough oxygen because an artery carrying blood to the heart muscles (coronary artery) becomes partly or temporarily blocked. This type of heart attack is usually less severe than the type of heart attack in which a coronary artery becomes completely blocked.  CAUSES  The most common cause of this condition is a blocked coronary artery. A coronary artery can become blocked from a gradual buildup of cholesterol, fat, and plaque. A blood clot can form over the plaque and block blood flow.  RISK FACTORS  This condition is more likely to develop in:  · Smokers.  · Males.  · Older adults.  · Overweight and obese adults.  · People with high blood pressure (hypertension), high cholesterol, or diabetes.  · People with a family history of heart  disease.  · People who do not get enough exercise.  · People who are under a lot of stress.  · People who drink too much alcohol.  · People who use illegal street drugs that increase the heart rate, such as cocaine and methamphetamines.  SYMPTOMS  Symptoms of this condition include:  · Chest pain or a feeling of pressure in the chest. It may feel like something is crushing or squeezing the chest.  · Discomfort in the upper back or in the area between the shoulder blades.  · Upper back pain.  · Tingling in the hands and arms.  · Shortness of breath.  · Heartburn or indigestion.  · Sudden cold sweats.  · Unexplained sweating.  · Sudden lightheadedness.  · Unexplained feelings of nervousness or anxiety.  · Feeling of tiredness, or not feeling well.  DIAGNOSIS  This condition is diagnosed based on a person's signs and symptoms and a physical exam. You may also have tests done, including:  · Blood tests.  · A chest X-ray.  · An test to measure the electrical activity of the heart (electrocardiogram).  · A test that uses sound waves to produce a picture of the heart (echocardiogram).  · A test to look at the heart arteries (coronary angiogram).  If you are still having chest pain after 12-24 hours, or if your health care providers think your heart is at risk, you may have a procedure called cardiac catheterization. In this procedure, a long, thin tube is inserted into an artery in your groin and moved up to the arteries in your heart. This procedure helps your health care provider figure out the source of the problem.   TREATMENT  This condition may be treated with:  · Bed rest in the hospital.  · Medicines to relieve chest pain.  · Medicines to protect the heart.  · If you have a blockage, a procedure in which the artery is opened (angioplasty) and a stent is placed to keep the artery open.  After initial treatment you may need to take medicine to:  · Keep your blood from clotting too easily.  · Control your blood  pressure.  · Lower your cholesterol.  · Control abnormal heart rhythms (arrhythmias).  HOME CARE INSTRUCTIONS  · Take medicines only as directed by your health care provider.  · Do not take the following medicines unless your health care provider approves:  ¨ Nonsteroidal anti-inflammatory drugs (NSAIDs), such as ibuprofen, naproxen, or celecoxib.  ¨ Vitamin supplements that contain vitamin A, vitamin E, or both.  ¨ Hormone replacement therapy that contains estrogen with or without progestin.  · Make lifestyle changes as directed by your health care provider. These may include:  ¨ Using no tobacco products, including cigarettes, chewing tobacco, and electronic cigarettes. If you are struggling to quit, ask your health care provider for help.  ¨ Exercising as directed by your health care provider. Ask for a list of activities that are safe for you.  ¨ Eating a heart-healthy diet. Work with a registered dietitian to learn healthy eating options.  ¨ Maintaining a healthy weight.  ¨ Managing other medical conditions, like diabetes.  ¨ Reducing stress.  ¨ Limiting how much alcohol you drink as directed by your health care provider.  SEEK IMMEDIATE MEDICAL CARE IF:  · You have any symptoms of this condition.     This information is not intended to replace advice given to you by your health care provider. Make sure you discuss any questions you have with your health care provider.     Document Released: 07/17/2006 Document Revised: 03/11/2013 Document Reviewed: 11/25/2015  [employer] Interactive Patient Education ©2016 [employer]          Heart Failure  Heart failure is a condition in which the heart has trouble pumping blood. This means your heart does not pump blood efficiently for your body to work well. In some cases of heart failure, fluid may back up into your lungs or you may have swelling (edema) in your lower legs. Heart failure is usually a long-term (chronic) condition. It is important for you to take good care  of yourself and follow your health care provider's treatment plan.  CAUSES   Some health conditions can cause heart failure. Those health conditions include:  · High blood pressure (hypertension). Hypertension causes the heart muscle to work harder than normal. When pressure in the blood vessels is high, the heart needs to pump (contract) with more force in order to circulate blood throughout the body. High blood pressure eventually causes the heart to become stiff and weak.  · Coronary artery disease (CAD). CAD is the buildup of cholesterol and fat (plaque) in the arteries of the heart. The blockage in the arteries deprives the heart muscle of oxygen and blood. This can cause chest pain and may lead to a heart attack. High blood pressure can also contribute to CAD.  · Heart attack (myocardial infarction). A heart attack occurs when one or more arteries in the heart become blocked. The loss of oxygen damages the muscle tissue of the heart. When this happens, part of the heart muscle dies. The injured tissue does not contract as well and weakens the heart's ability to pump blood.  · Abnormal heart valves. When the heart valves do not open and close properly, it can cause heart failure. This makes the heart muscle pump harder to keep the blood flowing.  · Heart muscle disease (cardiomyopathy or myocarditis). Heart muscle disease is damage to the heart muscle from a variety of causes. These can include drug or alcohol abuse, infections, or unknown reasons. These can increase the risk of heart failure.  · Lung disease. Lung disease makes the heart work harder because the lungs do not work properly. This can cause a strain on the heart, leading it to fail.  · Diabetes. Diabetes increases the risk of heart failure. High blood sugar contributes to high fat (lipid) levels in the blood. Diabetes can also cause slow damage to tiny blood vessels that carry important nutrients to the heart muscle. When the heart does not get  enough oxygen and food, it can cause the heart to become weak and stiff. This leads to a heart that does not contract efficiently.  · Other conditions can contribute to heart failure. These include abnormal heart rhythms, thyroid problems, and low blood counts (anemia).  Certain unhealthy behaviors can increase the risk of heart failure, including:  · Being overweight.  · Smoking or chewing tobacco.  · Eating foods high in fat and cholesterol.  · Abusing illicit drugs or alcohol.  · Lacking physical activity.  SYMPTOMS   Heart failure symptoms may vary and can be hard to detect. Symptoms may include:  · Shortness of breath with activity, such as climbing stairs.  · Persistent cough.  · Swelling of the feet, ankles, legs, or abdomen.  · Unexplained weight gain.  · Difficulty breathing when lying flat (orthopnea).  · Waking from sleep because of the need to sit up and get more air.  · Rapid heartbeat.  · Fatigue and loss of energy.  · Feeling light-headed, dizzy, or close to fainting.  · Loss of appetite.  · Nausea.  · Increased urination during the night (nocturia).  DIAGNOSIS   A diagnosis of heart failure is based on your history, symptoms, physical examination, and diagnostic tests. Diagnostic tests for heart failure may include:  · Echocardiography.  · Electrocardiography.  · Chest X-ray.  · Blood tests.  · Exercise stress test.  · Cardiac angiography.  · Radionuclide scans.  TREATMENT   Treatment is aimed at managing the symptoms of heart failure. Medicines, behavioral changes, or surgical intervention may be necessary to treat heart failure.  · Medicines to help treat heart failure may include:  ¨ Angiotensin-converting enzyme (ACE) inhibitors. This type of medicine blocks the effects of a blood protein called angiotensin-converting enzyme. ACE inhibitors relax (dilate) the blood vessels and help lower blood pressure.  ¨ Angiotensin receptor blockers (ARBs). This type of medicine blocks the actions of a blood  protein called angiotensin. Angiotensin receptor blockers dilate the blood vessels and help lower blood pressure.  ¨ Water pills (diuretics). Diuretics cause the kidneys to remove salt and water from the blood. The extra fluid is removed through urination. This loss of extra fluid lowers the volume of blood the heart pumps.  ¨ Beta blockers. These prevent the heart from beating too fast and improve heart muscle strength.  ¨ Digitalis. This increases the force of the heartbeat.  · Healthy behavior changes include:  ¨ Obtaining and maintaining a healthy weight.  ¨ Stopping smoking or chewing tobacco.  ¨ Eating heart-healthy foods.  ¨ Limiting or avoiding alcohol.  ¨ Stopping illicit drug use.  ¨ Physical activity as directed by your health care provider.  · Surgical treatment for heart failure may include:  ¨ A procedure to open blocked arteries, repair damaged heart valves, or remove damaged heart muscle tissue.  ¨ A pacemaker to improve heart muscle function and control certain abnormal heart rhythms.  ¨ An internal cardioverter defibrillator to treat certain serious abnormal heart rhythms.  ¨ A left ventricular assist device (LVAD) to assist the pumping ability of the heart.  HOME CARE INSTRUCTIONS   · Take medicines only as directed by your health care provider. Medicines are important in reducing the workload of your heart, slowing the progression of heart failure, and improving your symptoms.  ¨ Do not stop taking your medicine unless directed by your health care provider.  ¨ Do not skip any dose of medicine.  ¨ Refill your prescriptions before you run out of medicine. Your medicines are needed every day.  · Engage in moderate physical activity if directed by your health care provider. Moderate physical activity can benefit some people. The elderly and people with severe heart failure should consult with a health care provider for physical activity recommendations.  · Eat heart-healthy foods. Food choices should  be free of trans fat and low in saturated fat, cholesterol, and salt (sodium). Healthy choices include fresh or frozen fruits and vegetables, fish, lean meats, legumes, fat-free or low-fat dairy products, and whole grain or high fiber foods. Talk to a dietitian to learn more about heart-healthy foods.  · Limit sodium if directed by your health care provider. Sodium restriction may reduce symptoms of heart failure in some people. Talk to a dietitian to learn more about heart-healthy seasonings.  · Use healthy cooking methods. Healthy cooking methods include roasting, grilling, broiling, baking, poaching, steaming, or stir-frying. Talk to a dietitian to learn more about healthy cooking methods.  · Limit fluids if directed by your health care provider. Fluid restriction may reduce symptoms of heart failure in some people.  · Weigh yourself every day. Daily weights are important in the early recognition of excess fluid. You should weigh yourself every morning after you urinate and before you eat breakfast. Wear the same amount of clothing each time you weigh yourself. Record your daily weight. Provide your health care provider with your weight record.  · Monitor and record your blood pressure if directed by your health care provider.  · Check your pulse if directed by your health care provider.  · Lose weight if directed by your health care provider. Weight loss may reduce symptoms of heart failure in some people.  · Stop smoking or chewing tobacco. Nicotine makes your heart work harder by causing your blood vessels to constrict. Do not use nicotine gum or patches before talking to your health care provider.  · Keep all follow-up visits as directed by your health care provider. This is important.  · Limit alcohol intake to no more than 1 drink per day for nonpregnant women and 2 drinks per day for men. One drink equals 12 ounces of beer, 5 ounces of wine, or 1½ ounces of hard liquor. Drinking more than that is harmful  to your heart. Tell your health care provider if you drink alcohol several times a week. Talk with your health care provider about whether alcohol is safe for you. If your heart has already been damaged by alcohol or you have severe heart failure, drinking alcohol should be stopped completely.  · Stop illicit drug use.  · Stay up-to-date with immunizations. It is especially important to prevent respiratory infections through current pneumococcal and influenza immunizations.  · Manage other health conditions such as hypertension, diabetes, thyroid disease, or abnormal heart rhythms as directed by your health care provider.  · Learn to manage stress.  · Plan rest periods when fatigued.  · Learn strategies to manage high temperatures. If the weather is extremely hot:  ¨ Avoid vigorous physical activity.  ¨ Use air conditioning or fans or seek a cooler location.  ¨ Avoid caffeine and alcohol.  ¨ Wear loose-fitting, lightweight, and light-colored clothing.  · Learn strategies to manage cold temperatures. If the weather is extremely cold:  ¨ Avoid vigorous physical activity.  ¨ Layer clothes.  ¨ Wear mittens or gloves, a hat, and a scarf when going outside.  ¨ Avoid alcohol.  · Obtain ongoing education and support as needed.  · Participate in or seek rehabilitation as needed to maintain or improve independence and quality of life.  SEEK MEDICAL CARE IF:   · You have a rapid weight gain.  · You have increasing shortness of breath that is unusual for you.  · You are unable to participate in your usual physical activities.  · You tire easily.  · You cough more than normal, especially with physical activity.  · You have any or more swelling in areas such as your hands, feet, ankles, or abdomen.  · You are unable to sleep because it is hard to breathe.  · You feel like your heart is beating fast (palpitations).  · You become dizzy or light-headed upon standing up.  SEEK IMMEDIATE MEDICAL CARE IF:   · You have difficulty  breathing.  · There is a change in mental status such as decreased alertness or difficulty with concentration.  · You have a pain or discomfort in your chest.  · You have an episode of fainting (syncope).  MAKE SURE YOU:   · Understand these instructions.  · Will watch your condition.  · Will get help right away if you are not doing well or get worse.     This information is not intended to replace advice given to you by your health care provider. Make sure you discuss any questions you have with your health care provider.     Document Released: 12/18/2006 Document Revised: 05/03/2016 Document Reviewed: 01/17/2014  [employer] Interactive Patient Education ©2016 [employer]        · Is patient discharged on Warfarin / Coumadin?   No     · Is patient Post Blood Transfusion?  No    Depression / Suicide Risk    As you are discharged from this [facility] facility, it is important to learn how to keep safe from harming yourself.    Recognize the warning signs:  · Abrupt changes in personality, positive or negative- including increase in energy   · Giving away possessions  · Change in eating patterns- significant weight changes-  positive or negative  · Change in sleeping patterns- unable to sleep or sleeping all the time   · Unwillingness or inability to communicate  · Depression  · Unusual sadness, discouragement and loneliness  · Talk of wanting to die  · Neglect of personal appearance   · Rebelliousness- reckless behavior  · Withdrawal from people/activities they love  · Confusion- inability to concentrate     If you or a loved one observes any of these behaviors or has concerns about self-harm, here's what you can do:  · Talk about it- your feelings and reasons for harming yourself  · Remove any means that you might use to hurt yourself (examples: pills, rope, extension cords, firearm)  · Get professional help from the community (Mental Health, Substance Abuse, psychological counseling)  · Do not be alone:Call your  Safe Contact- someone whom you trust who will be there for you.  · Call your local CRISIS HOTLINE 839-9292 or 659-907-6269  · Call your local Children's Mobile Crisis Response Team [place] (662) 007-7771 or www.TaxiMe  · Call the toll free National Suicide Prevention Hotlines   · National Suicide Prevention Lifeline 808-144-CINL (5652)  · National [employer] Line Network 800-SUICIDE (343-4458)

## 2017-10-15 ENCOUNTER — PATIENT OUTREACH (OUTPATIENT)
Dept: HEALTH INFORMATION MANAGEMENT | Facility: OTHER | Age: 72
End: 2017-10-15

## 2017-10-15 LAB
BACTERIA BLD CULT: NORMAL
BACTERIA BLD CULT: NORMAL
SIGNIFICANT IND 70042: NORMAL
SIGNIFICANT IND 70042: NORMAL
SITE SITE: NORMAL
SITE SITE: NORMAL
SOURCE SOURCE: NORMAL
SOURCE SOURCE: NORMAL

## 2017-10-15 NOTE — DISCHARGE SUMMARY
Hospital Medicine Discharge Note     Admit Date:  10/7/2017       Discharge Date:   10/14/2017    Attending Physician:  Jovany Sanchez M.D.      Diagnoses (includes active and resolved):     Principal Problem:    NSTEMI (non-ST elevated myocardial infarction) (CMS-HCC) , stabilized  Active Problems:    Diabetes mellitus (CMS-HCC) POA: Yes    HTN (hypertension) POA: Yes    Chronic systolic CHF (congestive heart failure) (CMS-HCC) POA: Yes, without decompensation    Paroxysmal atrial fibrillation (CMS-HCC) POA: Yes, controlled    Cardiomyopathy (CMS-HCC) POA: Yes    Coagulopathy (CMS-HCC) POA: Yes    Hypokalemia POA: No, corrected    CKD (chronic kidney disease) stage 3, GFR 30-59 ml/min POA: Yes, stable    Hypophosphatemia POA: Unknown    Dyslipidemia POA: Yes    AICD (automatic cardioverter/defibrillator) present POA: Yes    Delirium POA: Unknown, resolved      Hospital Summary (Brief Narrative):       72-year-old male with known history of ischemic cardiomyopathy, ejection fraction of 20-25% and chronic atrial fibrillation, currently receives his care at the [facility].  He was transferred to our facility after he presented there complaining of lightheadedness, dizziness and shortness of breath. Had elevated troponin 2.85, diagnosed with non-ST elevation MI.  Had cardiac catheterization on 10/8/17 revealing severe multivessel disease with severely depressed LV of 23%.    Surgery was discussed with patient and family, he previously did not want bypass surgery.  Given his comorbidities felt to be higher risk.  Following admission the patient had no further chest pain.  Had  Thallium viability study that was reviewed by Dr. Le, cardiology,  and did not show any viable myocardium.  Recommended for continuation Eliquis with addition of Plavix per cardiology.   Beta blocker increased.  Medically optimized treatment preferred with outpatient follow-up in cardiology clinic.    Patient feeling well without chest pain or  shortness of breath was discharged home.    Consultants:        Angela Ybarra M.D, cardiology    Imaging/ Testing:      DX-CHEST-2 VIEWS   Final Result      Mild right infrahilar opacity likely represents atelectasis.      Mild cardiomegaly.      Atherosclerotic plaque.      ECHOCARDIOGRAM COMP W/O CONT   Final Result   Echocardiography Laboratory    CONCLUSIONS  No prior study is available for comparison.   Normal left ventricular chamber size.  Left ventricular ejection fraction is visually estimated to be 20%.  Severely reduced left ventricular systolic function.  Global hypokinesis.  Pacer/ICD wire seen in right ventricle.  Mildly dilated left atrium.  Aortic sclerosis without stenosis.  Trace aortic insufficiency.  Mitral annular calcification.  Mild mitral regurgitation.  Moderate tricuspid regurgitation.  Estimated right ventricular systolic pressure  is 65 mmHg.  Normal pericardium without effusion   NM-CARDIOVASCULAR STUDY, UNLISTED       There is no viable myocardium in the apical and periapical scar      Procedures:          Henrry Anglin M.D.   Interventional Cardiology           PreOp Diagnosis: Ischemic cardiomyopathy     PostOp Diagnosis: same     Procedure(s) :  Coronary Angiography, Left Heart Catheterization, Left Ventriculography. R radial approach     Surgeon(s):  Henrry Anglin M.D.     Type of Anesthesia: Moderate Sedation     Specimen: None     Estimated Blood Loss: 10 cc's     Contrast Media:  100 cc's     Fluoro Time: 2.2 min     Xray DAP: 13161     Findings: L dominant system.  100% mid LAD, 90% prox Circ, distal circ occluded.  LAD and PDA of Circ fill via collateral. 70% proximal ALESSANDRO  Severely depressed LV at 23%.     Complications: none        Henrry Anglin M.D.  10/8/2017 2:31 PM               Discharge Medications:             Medication List      START taking these medications      Instructions   clopidogrel 75 MG Tabs  Commonly known as:  PLAVIX   Take 1 Tab by mouth every  day.  Dose:  75 mg     insulin glargine 100 UNIT/ML Soln  Commonly known as:  LANTUS   Inject 20 Units as instructed every morning.  Dose:  20 Units     insulin lispro 100 UNIT/ML Soln  Commonly known as:  HUMALOG   Inject 2-9 Units as instructed 4 Times a Day,Before Meals and at Bedtime.  Dose:  2-9 Units     nystatin 237027 UNIT/GM Crea topical cream  Commonly known as:  MYCOSTATIN   Apply 0.0001 g to affected area(s) 2 times a day for 5 days.  Dose:  1 Units     rosuvastatin 40 MG tablet  Commonly known as:  CRESTOR   Take 1 Tab by mouth every evening.  Dose:  40 mg        CHANGE how you take these medications      Instructions   metoprolol SR 50 MG Tb24  What changed:  · medication strength  · how much to take  Commonly known as:  TOPROL XL   Take 3 Tabs by mouth every day.  Dose:  150 mg        CONTINUE taking these medications      Instructions   apixaban 5mg Tabs  Commonly known as:  ELIQUIS   Take 5 mg by mouth 2 Times a Day.  Dose:  5 mg     Cholecalciferol 98896 UNIT Caps   Take 1 Cap by mouth every 7 days.  Dose:  1 Cap     furosemide 20 MG Tabs  Commonly known as:  LASIX   Take 20 mg by mouth every day.  Dose:  20 mg     gabapentin 300 MG Caps  Commonly known as:  NEURONTIN   Take 600 mg by mouth 2 Times a Day.  Dose:  600 mg     isosorbide mononitrate SR 60 MG Tb24  Commonly known as:  IMDUR   Take 60 mg by mouth every morning.  Dose:  60 mg     losartan 50 MG Tabs  Commonly known as:  COZAAR   Take 50 mg by mouth every day.  Dose:  50 mg     ranitidine 150 MG Tabs  Commonly known as:  ZANTAC   Take 150 mg by mouth 2 times a day.  Dose:  150 mg     spironolactone 25 MG Tabs  Commonly known as:  ALDACTONE   Take 12.5 mg by mouth every day.  Dose:  12.5 mg        STOP taking these medications    fenofibrate 54 MG tablet  Commonly known as:  TRICOR     metformin 1000 MG tablet  Commonly known as:  GLUCOPHAGE               Diet:       DIET ORDERS (Through next 24h)    Cardiac             Activity:   As  tolerated.      Code status:   Full code        Follow up appointment details :      .  Primary Care Provider  [facility]  Schedule an appointment as soon as possible for a visit in 2 weeks  Hospital follow-up appointment with PCP    [facility]  [address] [address]  [phone]  On 10/16/2017  9:30 am          Time spent on discharge day patient visit: 40  minutes    #################################################

## 2017-10-15 NOTE — LETTER
Roderick Sims  [address] [place] [address]    October 15, 2017      Dear Roderick Sims,    [facility] wants to ensure your discharge home is safe and you or your loved ones have had all of your questions answered regarding your care after you leave the hospital.    Our discharge team was unsuccessful in our attempts to contact you telephonically and we wanted to be sure that you had a list of resources and contact information should you have any questions regarding your hospital stay, follow-up instructions, or active medical symptoms.    Questions or Concerns Regarding… Contact   Medical Questions Related to Your Discharge  (7 days a week, 8am-5pm) Contact a Nurse Care Coordinator   [phone]   Medical Questions Not Related to Your Discharge  (24 hours a day / 7 days a week)  Contact the Nurse Health Line   [phone]    Medications or Discharge Instructions Refer to your discharge packet   or contact your -[phone]   Follow-up Appointment(s) Schedule your appointment via [employer]   or contact Scheduling [phone]   Billing Review your statement via [employer]  or contact Billing [phone]   Medical Records Review your records via [employer]   or contact Medical Records [phone]     You can also easily access your medical information, test results and upcoming appointments via the [employer] free online health management tool. You can learn more and sign up at [employer]. For assistance setting up your [employer] account, please call [phone].    Once again, we want to ensure your discharge home is safe and that you have a clear understanding of any next steps in your care. If you have any questions or concerns, please do not hesitate to contact us, we are here for you. Thank you for choosing [facility] for your healthcare needs.    Sincerely,      Your [facility] Healthcare Team

## 2017-10-15 NOTE — PROGRESS NOTES
Placed discharge outreach phone call to patient s/p hospital discharge 10/14/17.  Received recording stating that phone number is temporarily out of service.  No answer, no option to leave voicemail.  Discharge outreach letter mailed to patient.

## 2017-12-13 LAB — EKG IMPRESSION: NORMAL

## 2020-07-20 ENCOUNTER — HOSPITAL ENCOUNTER (INPATIENT)
Dept: HOSPITAL 8 - ED | Age: 75
LOS: 7 days | Discharge: NURSING, REHAB OR LONG-TERM CARE | DRG: 917 | End: 2020-07-27
Attending: HOSPITALIST | Admitting: INTERNAL MEDICINE
Payer: MEDICARE

## 2020-07-20 VITALS — SYSTOLIC BLOOD PRESSURE: 137 MMHG | DIASTOLIC BLOOD PRESSURE: 95 MMHG

## 2020-07-20 VITALS — DIASTOLIC BLOOD PRESSURE: 73 MMHG | SYSTOLIC BLOOD PRESSURE: 117 MMHG

## 2020-07-20 VITALS — SYSTOLIC BLOOD PRESSURE: 135 MMHG | DIASTOLIC BLOOD PRESSURE: 86 MMHG

## 2020-07-20 VITALS — DIASTOLIC BLOOD PRESSURE: 76 MMHG | SYSTOLIC BLOOD PRESSURE: 150 MMHG

## 2020-07-20 VITALS — DIASTOLIC BLOOD PRESSURE: 64 MMHG | SYSTOLIC BLOOD PRESSURE: 113 MMHG

## 2020-07-20 VITALS — WEIGHT: 208.56 LBS | BODY MASS INDEX: 33.52 KG/M2 | HEIGHT: 66 IN

## 2020-07-20 VITALS — DIASTOLIC BLOOD PRESSURE: 67 MMHG | SYSTOLIC BLOOD PRESSURE: 113 MMHG

## 2020-07-20 DIAGNOSIS — Z66: ICD-10-CM

## 2020-07-20 DIAGNOSIS — D68.69: ICD-10-CM

## 2020-07-20 DIAGNOSIS — I13.0: ICD-10-CM

## 2020-07-20 DIAGNOSIS — J96.01: ICD-10-CM

## 2020-07-20 DIAGNOSIS — I08.1: ICD-10-CM

## 2020-07-20 DIAGNOSIS — E11.22: ICD-10-CM

## 2020-07-20 DIAGNOSIS — T58.91XA: Primary | ICD-10-CM

## 2020-07-20 DIAGNOSIS — I48.20: ICD-10-CM
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
## 2020-07-20 DIAGNOSIS — E87.2: ICD-10-CM

## 2020-07-20 DIAGNOSIS — E11.21: ICD-10-CM

## 2020-07-20 DIAGNOSIS — I50.43: ICD-10-CM

## 2020-07-20 DIAGNOSIS — D72.829: ICD-10-CM

## 2020-07-20 DIAGNOSIS — Z88.8: ICD-10-CM

## 2020-07-20 DIAGNOSIS — N17.0: ICD-10-CM

## 2020-07-20 DIAGNOSIS — Z79.01: ICD-10-CM

## 2020-07-20 DIAGNOSIS — I48.0: ICD-10-CM

## 2020-07-20 DIAGNOSIS — Z20.828: ICD-10-CM

## 2020-07-20 DIAGNOSIS — R19.7: ICD-10-CM

## 2020-07-20 DIAGNOSIS — F03.90: ICD-10-CM

## 2020-07-20 DIAGNOSIS — I47.2: ICD-10-CM

## 2020-07-20 DIAGNOSIS — N18.9: ICD-10-CM

## 2020-07-20 DIAGNOSIS — Z95.810: ICD-10-CM

## 2020-07-20 DIAGNOSIS — Z79.4: ICD-10-CM

## 2020-07-20 DIAGNOSIS — I25.2: ICD-10-CM

## 2020-07-20 DIAGNOSIS — E66.9: ICD-10-CM

## 2020-07-20 DIAGNOSIS — I25.10: ICD-10-CM

## 2020-07-20 DIAGNOSIS — E11.65: ICD-10-CM

## 2020-07-20 LAB
ALBUMIN SERPL-MCNC: 3.7 G/DL (ref 3.4–5)
ANION GAP SERPL CALC-SCNC: 8 MMOL/L (ref 5–15)
BASOPHILS # BLD AUTO: 0.05 X10^3/UL (ref 0–0.1)
BASOPHILS NFR BLD AUTO: 1 % (ref 0–1)
CALCIUM SERPL-MCNC: 9.3 MG/DL (ref 8.5–10.1)
CHLORIDE SERPL-SCNC: 106 MMOL/L (ref 98–107)
CREAT SERPL-MCNC: 2.19 MG/DL (ref 0.7–1.3)
EOSINOPHIL # BLD AUTO: 0.31 X10^3/UL (ref 0–0.4)
EOSINOPHIL NFR BLD AUTO: 3 % (ref 1–7)
ERYTHROCYTE [DISTWIDTH] IN BLOOD BY AUTOMATED COUNT: 12.6 % (ref 9.4–14.8)
LYMPHOCYTES # BLD AUTO: 4.26 X10^3/UL (ref 1–3.4)
LYMPHOCYTES NFR BLD AUTO: 38 % (ref 22–44)
MCH RBC QN AUTO: 30.8 PG (ref 27.5–34.5)
MCHC RBC AUTO-ENTMCNC: 33.1 G/DL (ref 33.2–36.2)
MD: NO
MONOCYTES # BLD AUTO: 0.96 X10^3/UL (ref 0.2–0.8)
MONOCYTES NFR BLD AUTO: 9 % (ref 2–9)
NEUTROPHILS # BLD AUTO: 5.67 X10^3/UL (ref 1.8–6.8)
NEUTROPHILS NFR BLD AUTO: 50 % (ref 42–75)
PLATELET # BLD AUTO: 195 X10^3/UL (ref 130–400)
PMV BLD AUTO: 10.8 FL (ref 7.4–10.4)
RBC # BLD AUTO: 5.34 X10^6/UL (ref 4.38–5.82)

## 2020-07-20 PROCEDURE — 82962 GLUCOSE BLOOD TEST: CPT

## 2020-07-20 PROCEDURE — 93005 ELECTROCARDIOGRAM TRACING: CPT

## 2020-07-20 PROCEDURE — 82375 ASSAY CARBOXYHB QUANT: CPT

## 2020-07-20 PROCEDURE — 83735 ASSAY OF MAGNESIUM: CPT

## 2020-07-20 PROCEDURE — C8929 TTE W OR WO FOL WCON,DOPPLER: HCPCS

## 2020-07-20 PROCEDURE — 99285 EMERGENCY DEPT VISIT HI MDM: CPT

## 2020-07-20 PROCEDURE — 84443 ASSAY THYROID STIM HORMONE: CPT

## 2020-07-20 PROCEDURE — 80053 COMPREHEN METABOLIC PANEL: CPT

## 2020-07-20 PROCEDURE — 83880 ASSAY OF NATRIURETIC PEPTIDE: CPT

## 2020-07-20 PROCEDURE — 83036 HEMOGLOBIN GLYCOSYLATED A1C: CPT

## 2020-07-20 PROCEDURE — 82803 BLOOD GASES ANY COMBINATION: CPT

## 2020-07-20 PROCEDURE — 82040 ASSAY OF SERUM ALBUMIN: CPT

## 2020-07-20 PROCEDURE — 83605 ASSAY OF LACTIC ACID: CPT

## 2020-07-20 PROCEDURE — 82010 KETONE BODYS QUAN: CPT

## 2020-07-20 PROCEDURE — 71045 X-RAY EXAM CHEST 1 VIEW: CPT

## 2020-07-20 PROCEDURE — 36600 WITHDRAWAL OF ARTERIAL BLOOD: CPT

## 2020-07-20 PROCEDURE — 87635 SARS-COV-2 COVID-19 AMP PRB: CPT

## 2020-07-20 PROCEDURE — 85025 COMPLETE CBC W/AUTO DIFF WBC: CPT

## 2020-07-20 PROCEDURE — 80048 BASIC METABOLIC PNL TOTAL CA: CPT

## 2020-07-20 PROCEDURE — 87324 CLOSTRIDIUM AG IA: CPT

## 2020-07-20 PROCEDURE — 36415 COLL VENOUS BLD VENIPUNCTURE: CPT

## 2020-07-20 RX ADMIN — INSULIN LISPRO SCH UNITS: 100 INJECTION, SOLUTION INTRAVENOUS; SUBCUTANEOUS at 16:35

## 2020-07-20 RX ADMIN — GABAPENTIN SCH MG: 300 CAPSULE ORAL at 21:16

## 2020-07-20 RX ADMIN — INSULIN LISPRO SCH UNITS: 100 INJECTION, SOLUTION INTRAVENOUS; SUBCUTANEOUS at 21:18

## 2020-07-20 RX ADMIN — ATORVASTATIN CALCIUM SCH MG: 80 TABLET, FILM COATED ORAL at 21:16

## 2020-07-20 RX ADMIN — APIXABAN SCH MG: 5 TABLET, FILM COATED ORAL at 21:16

## 2020-07-20 RX ADMIN — FENOFIBRATE SCH MG: 145 TABLET, FILM COATED ORAL at 21:16

## 2020-07-20 RX ADMIN — ACETAMINOPHEN PRN MG: 325 TABLET, FILM COATED ORAL at 21:16

## 2020-07-20 NOTE — NUR
PT SITTING UP IN BED, TALKING WITH FAMILY. ATTEMPT TO SIT UP TO EDGE OF BED AND 
USE URINAL. NAD NOTED AT THIS TIME. AWAITING ADMIT ORDER.

## 2020-07-20 NOTE — NUR
REPORT FROM MILAGROS WILLIAMSON. PT SITTING UP IN BED ON SIMPLE MASK. "IT'S HARD TO 
BREATHE" PT SPEAKING IN FULL SENTENCES TO FAMILY. SATURATING WELL ON SIMPLE 
MASK. LAB AT BEDSIDE.
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
## 2020-07-21 VITALS — SYSTOLIC BLOOD PRESSURE: 138 MMHG | DIASTOLIC BLOOD PRESSURE: 85 MMHG

## 2020-07-21 VITALS — SYSTOLIC BLOOD PRESSURE: 108 MMHG | DIASTOLIC BLOOD PRESSURE: 73 MMHG

## 2020-07-21 VITALS — DIASTOLIC BLOOD PRESSURE: 73 MMHG | SYSTOLIC BLOOD PRESSURE: 104 MMHG

## 2020-07-21 VITALS — SYSTOLIC BLOOD PRESSURE: 134 MMHG | DIASTOLIC BLOOD PRESSURE: 77 MMHG

## 2020-07-21 VITALS — DIASTOLIC BLOOD PRESSURE: 72 MMHG | SYSTOLIC BLOOD PRESSURE: 115 MMHG

## 2020-07-21 VITALS — SYSTOLIC BLOOD PRESSURE: 143 MMHG | DIASTOLIC BLOOD PRESSURE: 79 MMHG

## 2020-07-21 VITALS — SYSTOLIC BLOOD PRESSURE: 105 MMHG | DIASTOLIC BLOOD PRESSURE: 65 MMHG

## 2020-07-21 VITALS — DIASTOLIC BLOOD PRESSURE: 81 MMHG | SYSTOLIC BLOOD PRESSURE: 132 MMHG

## 2020-07-21 VITALS — SYSTOLIC BLOOD PRESSURE: 120 MMHG | DIASTOLIC BLOOD PRESSURE: 77 MMHG

## 2020-07-21 VITALS — DIASTOLIC BLOOD PRESSURE: 58 MMHG | SYSTOLIC BLOOD PRESSURE: 108 MMHG

## 2020-07-21 LAB
ALBUMIN SERPL-MCNC: 3.4 G/DL (ref 3.4–5)
ALP SERPL-CCNC: 57 U/L (ref 45–117)
ALT SERPL-CCNC: 28 U/L (ref 12–78)
ANION GAP SERPL CALC-SCNC: 10 MMOL/L (ref 5–15)
BASOPHILS # BLD AUTO: 0.03 X10^3/UL (ref 0–0.1)
BASOPHILS NFR BLD AUTO: 0 % (ref 0–1)
BILIRUB SERPL-MCNC: 0.9 MG/DL (ref 0.2–1)
CALCIUM SERPL-MCNC: 9 MG/DL (ref 8.5–10.1)
CHLORIDE SERPL-SCNC: 106 MMOL/L (ref 98–107)
CREAT SERPL-MCNC: 2.05 MG/DL (ref 0.7–1.3)
EOSINOPHIL # BLD AUTO: 0.05 X10^3/UL (ref 0–0.4)
EOSINOPHIL NFR BLD AUTO: 0 % (ref 1–7)
ERYTHROCYTE [DISTWIDTH] IN BLOOD BY AUTOMATED COUNT: 13.1 % (ref 9.4–14.8)
LYMPHOCYTES # BLD AUTO: 2.59 X10^3/UL (ref 1–3.4)
LYMPHOCYTES NFR BLD AUTO: 12 % (ref 22–44)
MCH RBC QN AUTO: 30.9 PG (ref 27.5–34.5)
MCHC RBC AUTO-ENTMCNC: 33.4 G/DL (ref 33.2–36.2)
MD: (no result)
MONOCYTES # BLD AUTO: 2.06 X10^3/UL (ref 0.2–0.8)
MONOCYTES NFR BLD AUTO: 10 % (ref 2–9)
NEUTROPHILS # BLD AUTO: 16.63 X10^3/UL (ref 1.8–6.8)
NEUTROPHILS NFR BLD AUTO: 78 % (ref 42–75)
O2 FLOW: 7 L/MIN
PLATELET # BLD AUTO: 185 X10^3/UL (ref 130–400)
PMV BLD AUTO: 10.8 FL (ref 7.4–10.4)
PROT SERPL-MCNC: 7.8 G/DL (ref 6.4–8.2)
RBC # BLD AUTO: 5.26 X10^6/UL (ref 4.38–5.82)

## 2020-07-21 RX ADMIN — INSULIN LISPRO SCH UNITS: 100 INJECTION, SOLUTION INTRAVENOUS; SUBCUTANEOUS at 20:59

## 2020-07-21 RX ADMIN — METOPROLOL SUCCINATE SCH MG: 100 TABLET, FILM COATED, EXTENDED RELEASE ORAL at 01:49

## 2020-07-21 RX ADMIN — INSULIN GLARGINE SCH UNITS: 100 INJECTION, SOLUTION SUBCUTANEOUS at 17:14

## 2020-07-21 RX ADMIN — DILTIAZEM HYDROCHLORIDE SCH MLS/HR: 5 INJECTION INTRAVENOUS at 17:15

## 2020-07-21 RX ADMIN — CLOPIDOGREL SCH MG: 75 TABLET, FILM COATED ORAL at 08:20

## 2020-07-21 RX ADMIN — ACETAMINOPHEN PRN MG: 325 TABLET, FILM COATED ORAL at 20:58

## 2020-07-21 RX ADMIN — SPIRONOLACTONE SCH MG: 25 TABLET ORAL at 08:20

## 2020-07-21 RX ADMIN — APIXABAN SCH MG: 5 TABLET, FILM COATED ORAL at 20:58

## 2020-07-21 RX ADMIN — INSULIN LISPRO SCH UNITS: 100 INJECTION, SOLUTION INTRAVENOUS; SUBCUTANEOUS at 12:45

## 2020-07-21 RX ADMIN — DILTIAZEM HYDROCHLORIDE SCH MLS/HR: 5 INJECTION INTRAVENOUS at 05:43

## 2020-07-21 RX ADMIN — APIXABAN SCH MG: 5 TABLET, FILM COATED ORAL at 08:20

## 2020-07-21 RX ADMIN — GABAPENTIN SCH MG: 300 CAPSULE ORAL at 20:58

## 2020-07-21 RX ADMIN — ISOSORBIDE MONONITRATE SCH MG: 60 TABLET, EXTENDED RELEASE ORAL at 08:20

## 2020-07-21 RX ADMIN — INSULIN LISPRO SCH UNITS: 100 INJECTION, SOLUTION INTRAVENOUS; SUBCUTANEOUS at 17:13

## 2020-07-21 RX ADMIN — INSULIN LISPRO SCH UNITS: 100 INJECTION, SOLUTION INTRAVENOUS; SUBCUTANEOUS at 08:21

## 2020-07-21 RX ADMIN — ATORVASTATIN CALCIUM SCH MG: 80 TABLET, FILM COATED ORAL at 20:57

## 2020-07-21 RX ADMIN — FENOFIBRATE SCH MG: 145 TABLET, FILM COATED ORAL at 20:57

## 2020-07-21 RX ADMIN — LOSARTAN POTASSIUM SCH MG: 50 TABLET, FILM COATED ORAL at 08:20

## 2020-07-21 RX ADMIN — INSULIN GLARGINE SCH UNITS: 100 INJECTION, SOLUTION SUBCUTANEOUS at 20:58

## 2020-07-22 VITALS — SYSTOLIC BLOOD PRESSURE: 99 MMHG | DIASTOLIC BLOOD PRESSURE: 63 MMHG

## 2020-07-22 VITALS — SYSTOLIC BLOOD PRESSURE: 138 MMHG | DIASTOLIC BLOOD PRESSURE: 76 MMHG

## 2020-07-22 VITALS — SYSTOLIC BLOOD PRESSURE: 118 MMHG | DIASTOLIC BLOOD PRESSURE: 71 MMHG

## 2020-07-22 VITALS — DIASTOLIC BLOOD PRESSURE: 60 MMHG | SYSTOLIC BLOOD PRESSURE: 100 MMHG

## 2020-07-22 VITALS — SYSTOLIC BLOOD PRESSURE: 95 MMHG | DIASTOLIC BLOOD PRESSURE: 61 MMHG

## 2020-07-22 VITALS — SYSTOLIC BLOOD PRESSURE: 111 MMHG | DIASTOLIC BLOOD PRESSURE: 68 MMHG

## 2020-07-22 VITALS — SYSTOLIC BLOOD PRESSURE: 120 MMHG | DIASTOLIC BLOOD PRESSURE: 60 MMHG

## 2020-07-22 LAB
ALBUMIN SERPL-MCNC: 3 G/DL (ref 3.4–5)
ALP SERPL-CCNC: 50 U/L (ref 45–117)
ALT SERPL-CCNC: 25 U/L (ref 12–78)
ANION GAP SERPL CALC-SCNC: 12 MMOL/L (ref 5–15)
BASOPHILS # BLD AUTO: 0.02 X10^3/UL (ref 0–0.1)
BASOPHILS NFR BLD AUTO: 0 % (ref 0–1)
BILIRUB SERPL-MCNC: 1 MG/DL (ref 0.2–1)
CALCIUM SERPL-MCNC: 9.3 MG/DL (ref 8.5–10.1)
CHLORIDE SERPL-SCNC: 107 MMOL/L (ref 98–107)
CLOSTRIDIUM DIFFICILE ANTIGEN: NEGATIVE
CLOSTRIDIUM DIFFICILE TOXIN: NEGATIVE
CREAT SERPL-MCNC: 2.82 MG/DL (ref 0.7–1.3)
EOSINOPHIL # BLD AUTO: 0.04 X10^3/UL (ref 0–0.4)
EOSINOPHIL NFR BLD AUTO: 0 % (ref 1–7)
ERYTHROCYTE [DISTWIDTH] IN BLOOD BY AUTOMATED COUNT: 13.5 % (ref 9.4–14.8)
EST. AVERAGE GLUCOSE BLD GHB EST-MCNC: 237 MG/DL (ref 0–126)
LYMPHOCYTES # BLD AUTO: 1.92 X10^3/UL (ref 1–3.4)
LYMPHOCYTES NFR BLD AUTO: 17 % (ref 22–44)
MCH RBC QN AUTO: 30.9 PG (ref 27.5–34.5)
MCHC RBC AUTO-ENTMCNC: 33.1 G/DL (ref 33.2–36.2)
MD: (no result)
MONOCYTES # BLD AUTO: 1.75 X10^3/UL (ref 0.2–0.8)
MONOCYTES NFR BLD AUTO: 15 % (ref 2–9)
NEUTROPHILS # BLD AUTO: 7.86 X10^3/UL (ref 1.8–6.8)
NEUTROPHILS NFR BLD AUTO: 68 % (ref 42–75)
PLATELET # BLD AUTO: 160 X10^3/UL (ref 130–400)
PMV BLD AUTO: 10.6 FL (ref 7.4–10.4)
PROT SERPL-MCNC: 7.8 G/DL (ref 6.4–8.2)
RBC # BLD AUTO: 4.81 X10^6/UL (ref 4.38–5.82)

## 2020-07-22 RX ADMIN — CLOPIDOGREL SCH MG: 75 TABLET, FILM COATED ORAL at 08:47

## 2020-07-22 RX ADMIN — INSULIN GLARGINE SCH UNITS: 100 INJECTION, SOLUTION SUBCUTANEOUS at 08:47

## 2020-07-22 RX ADMIN — APIXABAN SCH MG: 5 TABLET, FILM COATED ORAL at 20:15

## 2020-07-22 RX ADMIN — ISOSORBIDE MONONITRATE SCH MG: 60 TABLET, EXTENDED RELEASE ORAL at 08:48

## 2020-07-22 RX ADMIN — INSULIN LISPRO SCH UNITS: 100 INJECTION, SOLUTION INTRAVENOUS; SUBCUTANEOUS at 16:43

## 2020-07-22 RX ADMIN — SPIRONOLACTONE SCH MG: 25 TABLET ORAL at 08:47

## 2020-07-22 RX ADMIN — APIXABAN SCH MG: 5 TABLET, FILM COATED ORAL at 08:48

## 2020-07-22 RX ADMIN — GABAPENTIN SCH MG: 300 CAPSULE ORAL at 20:14

## 2020-07-22 RX ADMIN — METOPROLOL SUCCINATE SCH MG: 100 TABLET, FILM COATED, EXTENDED RELEASE ORAL at 05:17

## 2020-07-22 RX ADMIN — INSULIN LISPRO SCH UNITS: 100 INJECTION, SOLUTION INTRAVENOUS; SUBCUTANEOUS at 08:47

## 2020-07-22 RX ADMIN — LOSARTAN POTASSIUM SCH MG: 50 TABLET, FILM COATED ORAL at 08:48

## 2020-07-22 RX ADMIN — INSULIN LISPRO SCH UNITS: 100 INJECTION, SOLUTION INTRAVENOUS; SUBCUTANEOUS at 11:31

## 2020-07-22 RX ADMIN — ATORVASTATIN CALCIUM SCH MG: 80 TABLET, FILM COATED ORAL at 20:15

## 2020-07-22 RX ADMIN — DILTIAZEM HYDROCHLORIDE SCH MLS/HR: 5 INJECTION INTRAVENOUS at 05:17

## 2020-07-22 RX ADMIN — GUAIFENESIN SCH MG: 200 TABLET ORAL at 16:43

## 2020-07-22 RX ADMIN — GUAIFENESIN SCH MG: 200 TABLET ORAL at 20:14

## 2020-07-22 RX ADMIN — INSULIN LISPRO SCH UNITS: 100 INJECTION, SOLUTION INTRAVENOUS; SUBCUTANEOUS at 13:00

## 2020-07-22 RX ADMIN — DILTIAZEM HYDROCHLORIDE SCH MG: 30 TABLET, FILM COATED ORAL at 16:43

## 2020-07-22 RX ADMIN — DILTIAZEM HYDROCHLORIDE SCH MG: 30 TABLET, FILM COATED ORAL at 20:15

## 2020-07-22 RX ADMIN — FENOFIBRATE SCH MG: 145 TABLET, FILM COATED ORAL at 20:14

## 2020-07-22 RX ADMIN — INSULIN GLARGINE SCH UNITS: 100 INJECTION, SOLUTION SUBCUTANEOUS at 20:15

## 2020-07-22 RX ADMIN — INSULIN LISPRO SCH UNITS: 100 INJECTION, SOLUTION INTRAVENOUS; SUBCUTANEOUS at 20:16

## 2020-07-22 RX ADMIN — GUAIFENESIN SCH MG: 200 TABLET ORAL at 13:22

## 2020-07-23 VITALS — SYSTOLIC BLOOD PRESSURE: 107 MMHG | DIASTOLIC BLOOD PRESSURE: 59 MMHG

## 2020-07-23 VITALS — SYSTOLIC BLOOD PRESSURE: 123 MMHG | DIASTOLIC BLOOD PRESSURE: 68 MMHG

## 2020-07-23 VITALS — SYSTOLIC BLOOD PRESSURE: 135 MMHG | DIASTOLIC BLOOD PRESSURE: 76 MMHG

## 2020-07-23 VITALS — DIASTOLIC BLOOD PRESSURE: 72 MMHG | SYSTOLIC BLOOD PRESSURE: 116 MMHG

## 2020-07-23 VITALS — SYSTOLIC BLOOD PRESSURE: 119 MMHG | DIASTOLIC BLOOD PRESSURE: 70 MMHG

## 2020-07-23 VITALS — DIASTOLIC BLOOD PRESSURE: 81 MMHG | SYSTOLIC BLOOD PRESSURE: 135 MMHG

## 2020-07-23 LAB
ANION GAP SERPL CALC-SCNC: 11 MMOL/L (ref 5–15)
BASOPHILS # BLD AUTO: 0.03 X10^3/UL (ref 0–0.1)
BASOPHILS NFR BLD AUTO: 0 % (ref 0–1)
CALCIUM SERPL-MCNC: 8.2 MG/DL (ref 8.5–10.1)
CHLORIDE SERPL-SCNC: 108 MMOL/L (ref 98–107)
CREAT SERPL-MCNC: 2.36 MG/DL (ref 0.7–1.3)
EOSINOPHIL # BLD AUTO: 0.18 X10^3/UL (ref 0–0.4)
EOSINOPHIL NFR BLD AUTO: 2 % (ref 1–7)
ERYTHROCYTE [DISTWIDTH] IN BLOOD BY AUTOMATED COUNT: 13.2 % (ref 9.4–14.8)
LYMPHOCYTES # BLD AUTO: 2.12 X10^3/UL (ref 1–3.4)
LYMPHOCYTES NFR BLD AUTO: 25 % (ref 22–44)
MCH RBC QN AUTO: 31 PG (ref 27.5–34.5)
MCHC RBC AUTO-ENTMCNC: 33.3 G/DL (ref 33.2–36.2)
MD: NO
MONOCYTES # BLD AUTO: 1.13 X10^3/UL (ref 0.2–0.8)
MONOCYTES NFR BLD AUTO: 13 % (ref 2–9)
NEUTROPHILS # BLD AUTO: 5.12 X10^3/UL (ref 1.8–6.8)
NEUTROPHILS NFR BLD AUTO: 60 % (ref 42–75)
PLATELET # BLD AUTO: 180 X10^3/UL (ref 130–400)
PMV BLD AUTO: 10.6 FL (ref 7.4–10.4)
RBC # BLD AUTO: 4.52 X10^6/UL (ref 4.38–5.82)

## 2020-07-23 RX ADMIN — GUAIFENESIN SCH MG: 200 TABLET ORAL at 16:20

## 2020-07-23 RX ADMIN — GABAPENTIN SCH MG: 300 CAPSULE ORAL at 20:51

## 2020-07-23 RX ADMIN — DILTIAZEM HYDROCHLORIDE SCH MG: 30 TABLET, FILM COATED ORAL at 20:52

## 2020-07-23 RX ADMIN — ISOSORBIDE MONONITRATE SCH MG: 60 TABLET, EXTENDED RELEASE ORAL at 07:56

## 2020-07-23 RX ADMIN — GUAIFENESIN SCH MG: 200 TABLET ORAL at 05:34

## 2020-07-23 RX ADMIN — INSULIN LISPRO SCH UNITS: 100 INJECTION, SOLUTION INTRAVENOUS; SUBCUTANEOUS at 07:55

## 2020-07-23 RX ADMIN — INSULIN GLARGINE SCH UNITS: 100 INJECTION, SOLUTION SUBCUTANEOUS at 07:55

## 2020-07-23 RX ADMIN — INSULIN LISPRO SCH UNITS: 100 INJECTION, SOLUTION INTRAVENOUS; SUBCUTANEOUS at 20:52

## 2020-07-23 RX ADMIN — DILTIAZEM HYDROCHLORIDE SCH MG: 30 TABLET, FILM COATED ORAL at 07:56

## 2020-07-23 RX ADMIN — CLOPIDOGREL SCH MG: 75 TABLET, FILM COATED ORAL at 07:56

## 2020-07-23 RX ADMIN — GUAIFENESIN SCH MG: 200 TABLET ORAL at 11:08

## 2020-07-23 RX ADMIN — ATORVASTATIN CALCIUM SCH MG: 80 TABLET, FILM COATED ORAL at 20:51

## 2020-07-23 RX ADMIN — INSULIN GLARGINE SCH UNITS: 100 INJECTION, SOLUTION SUBCUTANEOUS at 20:53

## 2020-07-23 RX ADMIN — APIXABAN SCH MG: 5 TABLET, FILM COATED ORAL at 20:51

## 2020-07-23 RX ADMIN — LACTOBACILLUS TAB SCH TAB: TAB at 11:08

## 2020-07-23 RX ADMIN — DILTIAZEM HYDROCHLORIDE SCH MG: 30 TABLET, FILM COATED ORAL at 16:20

## 2020-07-23 RX ADMIN — INSULIN LISPRO SCH UNITS: 100 INJECTION, SOLUTION INTRAVENOUS; SUBCUTANEOUS at 16:20

## 2020-07-23 RX ADMIN — METOPROLOL SUCCINATE SCH MG: 100 TABLET, FILM COATED, EXTENDED RELEASE ORAL at 05:34

## 2020-07-23 RX ADMIN — FENOFIBRATE SCH MG: 145 TABLET, FILM COATED ORAL at 20:51

## 2020-07-23 RX ADMIN — ACETAMINOPHEN PRN MG: 325 TABLET, FILM COATED ORAL at 09:26

## 2020-07-23 RX ADMIN — INSULIN LISPRO SCH UNITS: 100 INJECTION, SOLUTION INTRAVENOUS; SUBCUTANEOUS at 11:11

## 2020-07-23 RX ADMIN — LACTOBACILLUS TAB SCH TAB: TAB at 20:51

## 2020-07-23 RX ADMIN — APIXABAN SCH MG: 5 TABLET, FILM COATED ORAL at 07:56

## 2020-07-23 RX ADMIN — GUAIFENESIN SCH MG: 200 TABLET ORAL at 20:51

## 2020-07-23 RX ADMIN — LACTOBACILLUS TAB SCH TAB: TAB at 16:20

## 2020-07-24 VITALS — DIASTOLIC BLOOD PRESSURE: 66 MMHG | SYSTOLIC BLOOD PRESSURE: 134 MMHG

## 2020-07-24 VITALS — DIASTOLIC BLOOD PRESSURE: 71 MMHG | SYSTOLIC BLOOD PRESSURE: 119 MMHG

## 2020-07-24 VITALS — DIASTOLIC BLOOD PRESSURE: 73 MMHG | SYSTOLIC BLOOD PRESSURE: 117 MMHG

## 2020-07-24 VITALS — SYSTOLIC BLOOD PRESSURE: 125 MMHG | DIASTOLIC BLOOD PRESSURE: 59 MMHG

## 2020-07-24 VITALS — DIASTOLIC BLOOD PRESSURE: 84 MMHG | SYSTOLIC BLOOD PRESSURE: 126 MMHG

## 2020-07-24 LAB
ANION GAP SERPL CALC-SCNC: 13 MMOL/L (ref 5–15)
CALCIUM SERPL-MCNC: 8.6 MG/DL (ref 8.5–10.1)
CHLORIDE SERPL-SCNC: 108 MMOL/L (ref 98–107)
CREAT SERPL-MCNC: 1.77 MG/DL (ref 0.7–1.3)

## 2020-07-24 RX ADMIN — INSULIN LISPRO SCH UNITS: 100 INJECTION, SOLUTION INTRAVENOUS; SUBCUTANEOUS at 20:18

## 2020-07-24 RX ADMIN — LACTOBACILLUS TAB SCH TAB: TAB at 20:17

## 2020-07-24 RX ADMIN — GUAIFENESIN SCH MG: 200 TABLET ORAL at 05:53

## 2020-07-24 RX ADMIN — ATORVASTATIN CALCIUM SCH MG: 80 TABLET, FILM COATED ORAL at 20:17

## 2020-07-24 RX ADMIN — DILTIAZEM HYDROCHLORIDE SCH MG: 30 TABLET, FILM COATED ORAL at 08:23

## 2020-07-24 RX ADMIN — INSULIN GLARGINE SCH UNITS: 100 INJECTION, SOLUTION SUBCUTANEOUS at 20:18

## 2020-07-24 RX ADMIN — LACTOBACILLUS TAB SCH TAB: TAB at 16:26

## 2020-07-24 RX ADMIN — CLOPIDOGREL SCH MG: 75 TABLET, FILM COATED ORAL at 08:23

## 2020-07-24 RX ADMIN — INSULIN LISPRO SCH UNITS: 100 INJECTION, SOLUTION INTRAVENOUS; SUBCUTANEOUS at 16:26

## 2020-07-24 RX ADMIN — DILTIAZEM HYDROCHLORIDE SCH MG: 30 TABLET, FILM COATED ORAL at 20:17

## 2020-07-24 RX ADMIN — LACTOBACILLUS TAB SCH TAB: TAB at 05:53

## 2020-07-24 RX ADMIN — DILTIAZEM HYDROCHLORIDE SCH MG: 30 TABLET, FILM COATED ORAL at 16:26

## 2020-07-24 RX ADMIN — GUAIFENESIN SCH MG: 200 TABLET ORAL at 11:12

## 2020-07-24 RX ADMIN — GUAIFENESIN PRN MG: 200 TABLET ORAL at 20:20

## 2020-07-24 RX ADMIN — ISOSORBIDE MONONITRATE SCH MG: 60 TABLET, EXTENDED RELEASE ORAL at 08:23

## 2020-07-24 RX ADMIN — APIXABAN SCH MG: 5 TABLET, FILM COATED ORAL at 08:23

## 2020-07-24 RX ADMIN — GABAPENTIN SCH MG: 300 CAPSULE ORAL at 20:17

## 2020-07-24 RX ADMIN — FENOFIBRATE SCH MG: 145 TABLET, FILM COATED ORAL at 20:17

## 2020-07-24 RX ADMIN — APIXABAN SCH MG: 5 TABLET, FILM COATED ORAL at 20:17

## 2020-07-24 RX ADMIN — LACTOBACILLUS TAB SCH TAB: TAB at 11:12

## 2020-07-24 RX ADMIN — INSULIN LISPRO SCH UNITS: 100 INJECTION, SOLUTION INTRAVENOUS; SUBCUTANEOUS at 11:13

## 2020-07-24 RX ADMIN — INSULIN GLARGINE SCH UNITS: 100 INJECTION, SOLUTION SUBCUTANEOUS at 08:25

## 2020-07-24 RX ADMIN — INSULIN LISPRO SCH UNITS: 100 INJECTION, SOLUTION INTRAVENOUS; SUBCUTANEOUS at 08:24

## 2020-07-24 RX ADMIN — METOPROLOL SUCCINATE SCH MG: 100 TABLET, FILM COATED, EXTENDED RELEASE ORAL at 05:53

## 2020-07-25 VITALS — DIASTOLIC BLOOD PRESSURE: 78 MMHG | SYSTOLIC BLOOD PRESSURE: 138 MMHG

## 2020-07-25 VITALS — SYSTOLIC BLOOD PRESSURE: 122 MMHG | DIASTOLIC BLOOD PRESSURE: 76 MMHG

## 2020-07-25 VITALS — DIASTOLIC BLOOD PRESSURE: 83 MMHG | SYSTOLIC BLOOD PRESSURE: 144 MMHG

## 2020-07-25 VITALS — SYSTOLIC BLOOD PRESSURE: 101 MMHG | DIASTOLIC BLOOD PRESSURE: 45 MMHG

## 2020-07-25 VITALS — DIASTOLIC BLOOD PRESSURE: 74 MMHG | SYSTOLIC BLOOD PRESSURE: 133 MMHG

## 2020-07-25 LAB
ANION GAP SERPL CALC-SCNC: 8 MMOL/L (ref 5–15)
CALCIUM SERPL-MCNC: 9 MG/DL (ref 8.5–10.1)
CHLORIDE SERPL-SCNC: 107 MMOL/L (ref 98–107)
CREAT SERPL-MCNC: 1.63 MG/DL (ref 0.7–1.3)

## 2020-07-25 RX ADMIN — LACTOBACILLUS TAB SCH TAB: TAB at 11:14

## 2020-07-25 RX ADMIN — INSULIN LISPRO SCH UNITS: 100 INJECTION, SOLUTION INTRAVENOUS; SUBCUTANEOUS at 11:14

## 2020-07-25 RX ADMIN — INSULIN LISPRO SCH UNITS: 100 INJECTION, SOLUTION INTRAVENOUS; SUBCUTANEOUS at 16:40

## 2020-07-25 RX ADMIN — APIXABAN SCH MG: 5 TABLET, FILM COATED ORAL at 07:53

## 2020-07-25 RX ADMIN — INSULIN LISPRO SCH UNITS: 100 INJECTION, SOLUTION INTRAVENOUS; SUBCUTANEOUS at 07:54

## 2020-07-25 RX ADMIN — ATORVASTATIN CALCIUM SCH MG: 80 TABLET, FILM COATED ORAL at 20:44

## 2020-07-25 RX ADMIN — INSULIN LISPRO SCH UNITS: 100 INJECTION, SOLUTION INTRAVENOUS; SUBCUTANEOUS at 11:48

## 2020-07-25 RX ADMIN — LACTOBACILLUS TAB SCH TAB: TAB at 05:20

## 2020-07-25 RX ADMIN — FENOFIBRATE SCH MG: 145 TABLET, FILM COATED ORAL at 20:43

## 2020-07-25 RX ADMIN — CLOPIDOGREL SCH MG: 75 TABLET, FILM COATED ORAL at 07:53

## 2020-07-25 RX ADMIN — INSULIN GLARGINE SCH UNITS: 100 INJECTION, SOLUTION SUBCUTANEOUS at 07:54

## 2020-07-25 RX ADMIN — METOPROLOL SUCCINATE SCH MG: 100 TABLET, FILM COATED, EXTENDED RELEASE ORAL at 05:20

## 2020-07-25 RX ADMIN — DILTIAZEM HYDROCHLORIDE SCH MG: 30 TABLET, FILM COATED ORAL at 20:44

## 2020-07-25 RX ADMIN — ISOSORBIDE MONONITRATE SCH MG: 60 TABLET, EXTENDED RELEASE ORAL at 07:53

## 2020-07-25 RX ADMIN — INSULIN GLARGINE SCH UNITS: 100 INJECTION, SOLUTION SUBCUTANEOUS at 20:43

## 2020-07-25 RX ADMIN — DILTIAZEM HYDROCHLORIDE SCH MG: 30 TABLET, FILM COATED ORAL at 16:39

## 2020-07-25 RX ADMIN — INSULIN LISPRO SCH UNITS: 100 INJECTION, SOLUTION INTRAVENOUS; SUBCUTANEOUS at 20:42

## 2020-07-25 RX ADMIN — DILTIAZEM HYDROCHLORIDE SCH MG: 30 TABLET, FILM COATED ORAL at 07:53

## 2020-07-25 RX ADMIN — APIXABAN SCH MG: 5 TABLET, FILM COATED ORAL at 20:44

## 2020-07-25 RX ADMIN — LACTOBACILLUS TAB SCH TAB: TAB at 20:43

## 2020-07-25 RX ADMIN — LACTOBACILLUS TAB SCH TAB: TAB at 16:39

## 2020-07-25 RX ADMIN — GABAPENTIN SCH MG: 300 CAPSULE ORAL at 20:43

## 2020-07-26 VITALS — SYSTOLIC BLOOD PRESSURE: 130 MMHG | DIASTOLIC BLOOD PRESSURE: 79 MMHG

## 2020-07-26 VITALS — SYSTOLIC BLOOD PRESSURE: 137 MMHG | DIASTOLIC BLOOD PRESSURE: 99 MMHG

## 2020-07-26 VITALS — DIASTOLIC BLOOD PRESSURE: 60 MMHG | SYSTOLIC BLOOD PRESSURE: 117 MMHG

## 2020-07-26 VITALS — SYSTOLIC BLOOD PRESSURE: 135 MMHG | DIASTOLIC BLOOD PRESSURE: 69 MMHG

## 2020-07-26 VITALS — DIASTOLIC BLOOD PRESSURE: 83 MMHG | SYSTOLIC BLOOD PRESSURE: 135 MMHG

## 2020-07-26 LAB
<PLATELET ESTIMATE>: ADEQUATE
<PLT MORPHOLOGY>: (no result)
ANION GAP SERPL CALC-SCNC: 8 MMOL/L (ref 5–15)
BAND#(MANUAL): 0.15 X10^3/UL
CALCIUM SERPL-MCNC: 8.8 MG/DL (ref 8.5–10.1)
CHLORIDE SERPL-SCNC: 110 MMOL/L (ref 98–107)
CREAT SERPL-MCNC: 1.46 MG/DL (ref 0.7–1.3)
EOS#(MANUAL): 0.31 X10^3/UL (ref 0–0.4)
EOS% (MANUAL): 2 % (ref 1–7)
ERYTHROCYTE [DISTWIDTH] IN BLOOD BY AUTOMATED COUNT: 13.1 % (ref 9.4–14.8)
LYMPH#(MANUAL): 3.21 X10^3/UL (ref 1–3.4)
LYMPHS% (MANUAL): 21 % (ref 22–44)
MCH RBC QN AUTO: 30.9 PG (ref 27.5–34.5)
MCHC RBC AUTO-ENTMCNC: 33.1 G/DL (ref 33.2–36.2)
MD: YES
MONOS#(MANUAL): 1.22 X10^3/UL (ref 0.3–2.7)
MONOS% (MANUAL): 8 % (ref 2–9)
NEUTS BAND NFR BLD: 1 % (ref 0–7)
PLATELET # BLD AUTO: 217 X10^3/UL (ref 130–400)
PMV BLD AUTO: 9.6 FL (ref 7.4–10.4)
POLYCHROMASIA BLD QL SMEAR: (no result)
RBC # BLD AUTO: 4.77 X10^6/UL (ref 4.38–5.82)
REACTIVE LYMPHS # (MANUAL): 0.31 X10^3/UL (ref 0–0)
REACTIVE LYMPHS % (MANUAL): 2 % (ref 0–0)
SEG#(MANUAL): 10.1 X10^3/UL (ref 1.8–6.8)
SEGS% (MANUAL): 66 % (ref 42–75)

## 2020-07-26 RX ADMIN — CLOPIDOGREL SCH MG: 75 TABLET, FILM COATED ORAL at 08:21

## 2020-07-26 RX ADMIN — INSULIN GLARGINE SCH UNITS: 100 INJECTION, SOLUTION SUBCUTANEOUS at 08:21

## 2020-07-26 RX ADMIN — METOPROLOL SUCCINATE SCH MG: 100 TABLET, FILM COATED, EXTENDED RELEASE ORAL at 05:11

## 2020-07-26 RX ADMIN — INSULIN LISPRO SCH UNITS: 100 INJECTION, SOLUTION INTRAVENOUS; SUBCUTANEOUS at 21:04

## 2020-07-26 RX ADMIN — LACTOBACILLUS TAB SCH TAB: TAB at 10:58

## 2020-07-26 RX ADMIN — FENOFIBRATE SCH MG: 145 TABLET, FILM COATED ORAL at 21:02

## 2020-07-26 RX ADMIN — GABAPENTIN SCH MG: 300 CAPSULE ORAL at 21:02

## 2020-07-26 RX ADMIN — INSULIN LISPRO SCH UNITS: 100 INJECTION, SOLUTION INTRAVENOUS; SUBCUTANEOUS at 08:20

## 2020-07-26 RX ADMIN — DILTIAZEM HYDROCHLORIDE SCH MG: 30 TABLET, FILM COATED ORAL at 08:21

## 2020-07-26 RX ADMIN — APIXABAN SCH MG: 5 TABLET, FILM COATED ORAL at 08:21

## 2020-07-26 RX ADMIN — INSULIN GLARGINE SCH UNITS: 100 INJECTION, SOLUTION SUBCUTANEOUS at 21:03

## 2020-07-26 RX ADMIN — INSULIN LISPRO SCH UNITS: 100 INJECTION, SOLUTION INTRAVENOUS; SUBCUTANEOUS at 16:15

## 2020-07-26 RX ADMIN — ACETAMINOPHEN PRN MG: 325 TABLET, FILM COATED ORAL at 21:27

## 2020-07-26 RX ADMIN — ISOSORBIDE MONONITRATE SCH MG: 60 TABLET, EXTENDED RELEASE ORAL at 08:21

## 2020-07-26 RX ADMIN — DILTIAZEM HYDROCHLORIDE SCH MG: 30 TABLET, FILM COATED ORAL at 21:02

## 2020-07-26 RX ADMIN — ATORVASTATIN CALCIUM SCH MG: 80 TABLET, FILM COATED ORAL at 21:02

## 2020-07-26 RX ADMIN — APIXABAN SCH MG: 5 TABLET, FILM COATED ORAL at 21:02

## 2020-07-26 RX ADMIN — LACTOBACILLUS TAB SCH TAB: TAB at 16:00

## 2020-07-26 RX ADMIN — GUAIFENESIN PRN MG: 200 TABLET ORAL at 05:11

## 2020-07-26 RX ADMIN — LACTOBACILLUS TAB SCH TAB: TAB at 21:02

## 2020-07-26 RX ADMIN — LACTOBACILLUS TAB SCH TAB: TAB at 05:12

## 2020-07-26 RX ADMIN — INSULIN LISPRO SCH UNITS: 100 INJECTION, SOLUTION INTRAVENOUS; SUBCUTANEOUS at 10:59

## 2020-07-26 RX ADMIN — DILTIAZEM HYDROCHLORIDE SCH MG: 30 TABLET, FILM COATED ORAL at 16:14

## 2020-07-27 VITALS — SYSTOLIC BLOOD PRESSURE: 141 MMHG | DIASTOLIC BLOOD PRESSURE: 84 MMHG

## 2020-07-27 VITALS — SYSTOLIC BLOOD PRESSURE: 158 MMHG | DIASTOLIC BLOOD PRESSURE: 90 MMHG

## 2020-07-27 VITALS — SYSTOLIC BLOOD PRESSURE: 129 MMHG | DIASTOLIC BLOOD PRESSURE: 64 MMHG

## 2020-07-27 VITALS — DIASTOLIC BLOOD PRESSURE: 73 MMHG | SYSTOLIC BLOOD PRESSURE: 129 MMHG

## 2020-07-27 LAB
ANION GAP SERPL CALC-SCNC: 9 MMOL/L (ref 5–15)
BASOPHILS # BLD AUTO: 0.04 X10^3/UL (ref 0–0.1)
BASOPHILS NFR BLD AUTO: 0 % (ref 0–1)
CALCIUM SERPL-MCNC: 7.8 MG/DL (ref 8.5–10.1)
CHLORIDE SERPL-SCNC: 107 MMOL/L (ref 98–107)
CREAT SERPL-MCNC: 1.62 MG/DL (ref 0.7–1.3)
EOSINOPHIL # BLD AUTO: 0.22 X10^3/UL (ref 0–0.4)
EOSINOPHIL NFR BLD AUTO: 2 % (ref 1–7)
ERYTHROCYTE [DISTWIDTH] IN BLOOD BY AUTOMATED COUNT: 13.1 % (ref 9.4–14.8)
LYMPHOCYTES # BLD AUTO: 3.11 X10^3/UL (ref 1–3.4)
LYMPHOCYTES NFR BLD AUTO: 22 % (ref 22–44)
MCH RBC QN AUTO: 30.6 PG (ref 27.5–34.5)
MCHC RBC AUTO-ENTMCNC: 33.2 G/DL (ref 33.2–36.2)
MD: NO
MONOCYTES # BLD AUTO: 1.29 X10^3/UL (ref 0.2–0.8)
MONOCYTES NFR BLD AUTO: 9 % (ref 2–9)
NEUTROPHILS # BLD AUTO: 9.4 X10^3/UL (ref 1.8–6.8)
NEUTROPHILS NFR BLD AUTO: 67 % (ref 42–75)
PLATELET # BLD AUTO: 201 X10^3/UL (ref 130–400)
PMV BLD AUTO: 10.1 FL (ref 7.4–10.4)
RBC # BLD AUTO: 4.66 X10^6/UL (ref 4.38–5.82)

## 2020-07-27 RX ADMIN — INSULIN GLARGINE SCH UNITS: 100 INJECTION, SOLUTION SUBCUTANEOUS at 08:33

## 2020-07-27 RX ADMIN — LACTOBACILLUS TAB SCH TAB: TAB at 10:54

## 2020-07-27 RX ADMIN — INSULIN LISPRO SCH UNITS: 100 INJECTION, SOLUTION INTRAVENOUS; SUBCUTANEOUS at 10:54

## 2020-07-27 RX ADMIN — APIXABAN SCH MG: 5 TABLET, FILM COATED ORAL at 08:25

## 2020-07-27 RX ADMIN — ISOSORBIDE MONONITRATE SCH MG: 60 TABLET, EXTENDED RELEASE ORAL at 08:26

## 2020-07-27 RX ADMIN — INSULIN LISPRO SCH UNITS: 100 INJECTION, SOLUTION INTRAVENOUS; SUBCUTANEOUS at 08:33

## 2020-07-27 RX ADMIN — LACTOBACILLUS TAB SCH TAB: TAB at 05:59

## 2020-07-27 RX ADMIN — METOPROLOL SUCCINATE SCH MG: 100 TABLET, FILM COATED, EXTENDED RELEASE ORAL at 05:59

## 2020-07-27 RX ADMIN — DILTIAZEM HYDROCHLORIDE SCH MG: 30 TABLET, FILM COATED ORAL at 08:26

## 2020-07-27 RX ADMIN — CLOPIDOGREL SCH MG: 75 TABLET, FILM COATED ORAL at 08:26

## 2021-01-14 DIAGNOSIS — Z23 NEED FOR VACCINATION: ICD-10-CM
